# Patient Record
Sex: FEMALE | Race: WHITE | Employment: UNEMPLOYED | URBAN - METROPOLITAN AREA
[De-identification: names, ages, dates, MRNs, and addresses within clinical notes are randomized per-mention and may not be internally consistent; named-entity substitution may affect disease eponyms.]

---

## 2018-09-09 ENCOUNTER — HOSPITAL ENCOUNTER (EMERGENCY)
Age: 83
Discharge: HOME | End: 2018-09-09
Attending: EMERGENCY MEDICINE
Payer: MEDICARE

## 2018-09-09 VITALS
BODY MASS INDEX: 20.52 KG/M2 | HEART RATE: 96 BPM | OXYGEN SATURATION: 96 % | TEMPERATURE: 97.6 F | RESPIRATION RATE: 16 BRPM | SYSTOLIC BLOOD PRESSURE: 162 MMHG | WEIGHT: 120.2 LBS | DIASTOLIC BLOOD PRESSURE: 108 MMHG | HEIGHT: 64 IN

## 2018-09-09 DIAGNOSIS — S81.811A NONINFECTED SKIN TEAR OF RIGHT LOWER EXTREMITY, INITIAL ENCOUNTER: Primary | ICD-10-CM

## 2018-09-09 PROCEDURE — 99282 EMERGENCY DEPT VISIT SF MDM: CPT

## 2018-09-09 PROCEDURE — 6370000000 HC RX 637 (ALT 250 FOR IP): Performed by: EMERGENCY MEDICINE

## 2018-09-09 RX ORDER — CEPHALEXIN 500 MG/1
500 CAPSULE ORAL 4 TIMES DAILY
Qty: 28 CAPSULE | Refills: 0 | Status: SHIPPED | OUTPATIENT
Start: 2018-09-09 | End: 2018-09-16

## 2018-09-09 RX ORDER — CEPHALEXIN 500 MG/1
500 CAPSULE ORAL ONCE
Status: COMPLETED | OUTPATIENT
Start: 2018-09-09 | End: 2018-09-09

## 2018-09-09 RX ADMIN — CEPHALEXIN 500 MG: 500 CAPSULE ORAL at 21:54

## 2018-09-10 NOTE — ED NOTES
Pt dc/d with instructions and rx in stable condition, ambulatory to lobby. Home per ride.       Carol Jaquez RN  09/09/18 3301

## 2018-09-10 NOTE — ED PROVIDER NOTES
Emergency Department Encounter  [address]     Patient: Trena Rodriguez  MRN: [id number]  : 1929  Date of Evaluation: 2018  ED Provider: Seema Mccarty MD    Chief Complaint       Chief Complaint   Patient presents with    Other     skin tear to rt ant shin last night on a metal bed frame     Nae Soto is a 80 y.o. female who presents to the emergency department This is a very pleasant 20-year-old female is diabetic brought to the emergency department for evaluation of a skin tear to her right shin. Patient reports being in usual state of health until yesterday. She says that she struck her shin on the metal bed frame. Suffered a large skin tear to the anterior aspect of her right lower leg. Patient is not on any blood thinners at this in an 81 mg aspirin. She is not diabetic. Unsure whether or not her tetanus shot has been updated within the past 5 years. Patient denies numbness tingling or coldness in her right foot and has been ambulatory since the incident. ROS:     At least 10 systems reviewed and otherwise acutely negative except as in the [address] Past History     Past Medical History:   Diagnosis Date    Hyperlipidemia     Hypertension     Hypothyroidism      Past Surgical History:   Procedure Laterality Date    JOINT REPLACEMENT      rt knee replacement     Social History     Social History    Marital status: Unknown     Spouse name: N/A    Number of children: N/A    Years of education: N/A     Social History Main Topics    Smoking status: Former Smoker    Smokeless tobacco: Never Used    Alcohol use Yes      Comment: social    Drug use: Unknown    Sexual activity: Not Asked     Other Topics Concern    None     Social History Narrative    None       Medications/Allergies     Previous Medications    AMITRIPTYLINE (ELAVIL) 25 MG TABLET        ASPIRIN 81 MG TABLET    Take 81 mg by mouth daily.     CYANOCOBALAMIN (VITAMIN B-12 PO) Patient tolerated the procedure well. He was then covered with a nonstick dressing and covered with Kerlix. Patient was advised to keep the dressing place for 48 hours and to be seen by her primary care physician during this time. ED Course and MDM   In brief, Felicie Schlatter is a 80 y.o. female who presented to the emergency department For evaluation of skin tear over the right anterior shin. Based on patient's history and physical do not believe any imaging or laboratory work is needed at this time. Patient was provided Keflex here in the emergency department. Her tetanus shot is updated already within the past 1 year. Skin tear was approximated as per procedure noted above. ED Medication Orders     Start Ordered     Status Ordering Provider    09/09/18 2145 09/09/18 2135  cephALEXin (KEFLEX) capsule 500 mg  ONCE      Last MAR action:  Given - by Pato Ross on 09/09/18 at [address]          Final Impression      1.  Noninfected skin tear of right lower extremity, initial encounter      DISPOSITION       (Please note that portions of this note may have been completed with a voice recognition program. Efforts were made to edit the dictations but occasionally words are mis-transcribed.)    Humera Yeung MD  [address], [place]  09/09/18 0287

## 2019-02-12 ENCOUNTER — OFFICE VISIT (OUTPATIENT)
Dept: DERMATOLOGY | Age: 84
End: 2019-02-12
Payer: MEDICARE

## 2019-02-12 DIAGNOSIS — D48.5 NEOPLASM OF UNCERTAIN BEHAVIOR OF SKIN: Primary | ICD-10-CM

## 2019-02-12 PROCEDURE — 11103 TANGNTL BX SKIN EA SEP/ADDL: CPT | Performed by: DERMATOLOGY

## 2019-02-12 PROCEDURE — 11102 TANGNTL BX SKIN SINGLE LES: CPT | Performed by: DERMATOLOGY

## 2019-02-14 LAB — DERMATOLOGY PATHOLOGY REPORT: ABNORMAL

## 2019-02-18 DIAGNOSIS — C44.310 BASAL CELL CARCINOMA (BCC) OF SKIN OF FACE, UNSPECIFIED PART OF FACE: Primary | ICD-10-CM

## 2019-02-22 ENCOUNTER — TELEPHONE (OUTPATIENT)
Dept: SURGERY | Age: 84
End: 2019-02-22

## 2019-06-03 ENCOUNTER — TELEPHONE (OUTPATIENT)
Dept: DERMATOLOGY | Age: 84
End: 2019-06-03

## 2019-06-11 NOTE — TELEPHONE ENCOUNTER
SAVANNA:  Called and spoke to pt regarding Moh's surgery. Pt stated she would not like to proceed with getting multiple BCC's treated.

## 2019-07-31 ENCOUNTER — OFFICE VISIT (OUTPATIENT)
Dept: DERMATOLOGY | Age: 84
End: 2019-07-31
Payer: MEDICARE

## 2019-07-31 DIAGNOSIS — L72.0 MILIA: Primary | ICD-10-CM

## 2019-07-31 DIAGNOSIS — L57.0 ACTINIC KERATOSIS: ICD-10-CM

## 2019-07-31 DIAGNOSIS — C44.41 BCC (BASAL CELL CARCINOMA), SCALP/NECK: ICD-10-CM

## 2019-07-31 DIAGNOSIS — C44.310 BCC (BASAL CELL CARCINOMA), FACE: ICD-10-CM

## 2019-07-31 PROCEDURE — 17003 DESTRUCT PREMALG LES 2-14: CPT | Performed by: DERMATOLOGY

## 2019-07-31 PROCEDURE — 17000 DESTRUCT PREMALG LESION: CPT | Performed by: DERMATOLOGY

## 2019-07-31 PROCEDURE — 99213 OFFICE O/P EST LOW 20 MIN: CPT | Performed by: DERMATOLOGY

## 2019-07-31 NOTE — PROGRESS NOTES
[facility]  Areli Randall MD  [phone]      Christopher Dyson  5/16/1929    80 y.o. female     Date of Visit: 7/31/2019    Chief Complaint: BCCs    History of Present Illness:    1. She complains of few asymptomatic lesions on the lower face. 2.  She has multiple BCCs on the face - see below. Initially declined treatment but is reconsidering. DIAGNOSIS:   A. Right central frontal scalp-   Basal Cell Carcinoma, Nodular Type     B. Right central forehead-   Basal Cell Carcinoma, Nodular Type     C. Left upper forehead-   Basal Cell Carcinoma, Nodular Type     D. Right superior temple-   Basal Cell Carcinoma, Nodular Type     E. Right lower temple-   Basal Cell Carcinoma, Nodular Type     F. Right lower cheek-   Basal Cell Carcinoma, Nodular Type (not visible today)    Has hx of likely radiation therapy for acne. 3.  She also complains of persistent scaly lesions on the left upper lip and left cheek    Review of Systems:  Skin: No new or changing moles. Past Medical History, Family History, Surgical History, Medications and Allergies reviewed. Past Medical History:   Diagnosis Date    Basal cell carcinoma     Hyperlipidemia     Hypertension     Hypothyroidism      Past Surgical History:   Procedure Laterality Date    JOINT REPLACEMENT  2010    rt knee replacement    JOINT REPLACEMENT  2014    L knee       No Known Allergies  Outpatient Medications Marked as Taking for the 7/31/19 encounter (Office Visit) with Nanette Martin MD   Medication Sig Dispense Refill    vitamin D3 (CHOLECALCIFEROL) 400 units TABS tablet Take 400 Units by mouth daily      vitamin E 400 UNIT capsule Take 400 Units by mouth daily      Multiple Vitamin (MULTI VITAMIN DAILY PO) Take  by mouth daily.  aspirin 81 MG tablet Take 81 mg by mouth daily.       amitriptyline (ELAVIL) 25 MG tablet       traZODone (DESYREL) 50 MG tablet       traMADol (ULTRAM) 50 MG tablet      

## 2019-08-01 ENCOUNTER — TELEPHONE (OUTPATIENT)
Dept: SURGERY | Age: 84
End: 2019-08-01

## 2019-08-01 NOTE — TELEPHONE ENCOUNTER
Pt called back and scheduled 2 Mohs; after having a consultation with Dr. Ailin Leigh earlier in the year. She scheduled the L nasal dorsum - NBCC focally infiltrating on 8/13 at 7:30 & the Right nasal tip - NBCC.       There are still 6 sites pending to schedule but the patient wanted the nose done first.      6 sites pending scheduling are:   1.) NBCC R central frontal scalp  2.) R central forehead - NBCC  3.) L upper forheaed - NBCC  4.) R superior temple - NBCC   5.) R lower temple - NBCC   6.) R lower cheek  - NBCC

## 2019-08-13 ENCOUNTER — PROCEDURE VISIT (OUTPATIENT)
Dept: SURGERY | Age: 84
End: 2019-08-13
Payer: MEDICARE

## 2019-08-13 VITALS
DIASTOLIC BLOOD PRESSURE: 82 MMHG | HEART RATE: 80 BPM | OXYGEN SATURATION: 98 % | WEIGHT: 125 LBS | SYSTOLIC BLOOD PRESSURE: 155 MMHG | BODY MASS INDEX: 21.46 KG/M2

## 2019-08-13 DIAGNOSIS — C44.311 BASAL CELL CARCINOMA (BCC) OF DORSUM OF NOSE: Primary | ICD-10-CM

## 2019-08-13 PROCEDURE — 17311 MOHS 1 STAGE H/N/HF/G: CPT | Performed by: DERMATOLOGY

## 2019-08-13 PROCEDURE — 17312 MOHS ADDL STAGE: CPT | Performed by: DERMATOLOGY

## 2019-08-13 PROCEDURE — 14060 TIS TRNFR E/N/E/L 10 SQ CM/<: CPT | Performed by: DERMATOLOGY

## 2019-08-13 NOTE — PROGRESS NOTES
closed using  5-0  Vicryl and the epidermis was approximated using 6-0 Ethilon running epidermal sutures . WOUND COVERAGE:  The wound was cleaned with normal saline solution, dried off, Aquaphor ointment was applied, and the wound was covered. A dressing was applied for stabilization and light pressure. The patient was given detailed oral and written instructions on postoperative care. There were no complications. The patient left the Unit in good medical condition. FOLLOW-UP:  The patient will return for suture removal in 7 days.

## 2019-08-14 ENCOUNTER — TELEPHONE (OUTPATIENT)
Dept: SURGERY | Age: 84
End: 2019-08-14

## 2019-08-14 ENCOUNTER — TELEPHONE (OUTPATIENT)
Dept: DERMATOLOGY | Age: 84
End: 2019-08-14

## 2019-08-20 ENCOUNTER — PROCEDURE VISIT (OUTPATIENT)
Dept: SURGERY | Age: 84
End: 2019-08-20
Payer: MEDICARE

## 2019-08-20 VITALS
OXYGEN SATURATION: 92 % | SYSTOLIC BLOOD PRESSURE: 136 MMHG | DIASTOLIC BLOOD PRESSURE: 74 MMHG | BODY MASS INDEX: 20.6 KG/M2 | TEMPERATURE: 97.6 F | WEIGHT: 120 LBS | HEART RATE: 101 BPM

## 2019-08-20 DIAGNOSIS — Z48.02 VISIT FOR SUTURE REMOVAL: ICD-10-CM

## 2019-08-20 DIAGNOSIS — C44.319 BASAL CELL CARCINOMA OF RIGHT TEMPLE REGION: Primary | ICD-10-CM

## 2019-08-20 PROCEDURE — 12052 INTMD RPR FACE/MM 2.6-5.0 CM: CPT | Performed by: DERMATOLOGY

## 2019-08-20 PROCEDURE — 17311 MOHS 1 STAGE H/N/HF/G: CPT | Performed by: DERMATOLOGY

## 2019-08-20 NOTE — PROGRESS NOTES
PRE-PROCEDURE SCREENING    Pacemaker/ICD: No  Difficulty with numbing in the past: No  Local Anesthesia Reaction/passing out: No  Latex or adhesive allergy:  No  Bleeding/Clotting Disorders: No  Anticoagulant Therapy: Yes, asa 81 mg  Joint prosthesis: Yes, bilateral knees (most recent approx 2014)  Artificial Heart Valve: No  Stroke or Seizures: No  Organ Transplant or Lymphoma: No  Immunosuppression: No  Respiratory Problems: No

## 2019-08-20 NOTE — PROGRESS NOTES
S:  The patient is here for suture removal s/p Mohs surgery on the left nasal sidewall and Unilateral Advancement Flap repair, 1 week(s) ago. The site appears well-healed without signs of infection (redness, pain or discharge). The sutures were removed. Flap looks good. Wound care and activity instructions given. The patient was scheduled for follow-up in 7-8 weeks for scar/wound check. The patient was scheduled for f/u with General Dermatology per their instructions.

## 2019-08-20 NOTE — PATIENT INSTRUCTIONS
improve. However, you may have swelling for several months after your procedure, especially if your wound is on your face, near your eye or mouth. 2.  Some soreness and redness around your wound. 3.  Bruising, which could last 1 week or more. 4.  Pink and bumpy appearance to the scar. This may happen a few weeks after your procedure. After 4 weeks, you may gently massage the area each day with a facial moisturizer or petroleum jelly (Vaseline) or Aquaphor. This will help to smooth the skin and improve the appearance of the scar. The color of your scar will fade over time, but may be pink for several months after the procedure. The scar may take 6 months to 1 year to reach its final color and appearance. 5.  Spitting suture. Occasionally, an inside suture (stitch) does not completely dissolve. When this happens, (generally 4-8 weeks after surgery), it causes a bump or pimple to form on the scar. This is easily removed and is not at all serious. It does not mean the skin cancer has returned. Contact us if it happens, but do not be alarmed. Vitamin E oil is NOT necessary. A good moisturizer is just as effective. Sunscreen IS necessary. Use at least an SPF 30 sunscreen daily- even in the winter.      Call us at [phone] right away if you have any of the following symptoms:   Bleeding that you cant stop (see above)   Pain that lasts longer than 48 hours   Your wound becomes more painful, red or hot   Bruising and swelling that does not improve within 48 hours or gets worse suddenly

## 2019-08-21 ENCOUNTER — TELEPHONE (OUTPATIENT)
Dept: SURGERY | Age: 84
End: 2019-08-21

## 2019-08-21 NOTE — TELEPHONE ENCOUNTER
The patient was in the office 8/20/19 for mohs located on the right superior temple with [address] repair. The patient tolerated the procedure well and left the office in good condition. A post-operative telephone call was placed at 12:46pm on 8/21/19 in order to check on the patient's recovery process. The patient reported doing well and had no complaints. All of the patient's questions were answered.

## 2019-09-16 ENCOUNTER — TELEPHONE (OUTPATIENT)
Dept: SURGERY | Age: 84
End: 2019-09-16

## 2019-09-16 NOTE — TELEPHONE ENCOUNTER
Discussed with patient the possibility of a spitting suture, patient wants to attempt massaging daily for 2 days to see if the area improves, if not she will call to get appt. for wound check/removal of spitting suture. Pt verbalized understanding.

## 2019-10-02 ENCOUNTER — OFFICE VISIT (OUTPATIENT)
Dept: SURGERY | Age: 84
End: 2019-10-02

## 2019-10-02 DIAGNOSIS — T81.49XA WOUND INFECTION AFTER SURGERY: ICD-10-CM

## 2019-10-02 DIAGNOSIS — Z51.89 VISIT FOR WOUND CHECK: Primary | ICD-10-CM

## 2019-10-02 PROCEDURE — 99024 POSTOP FOLLOW-UP VISIT: CPT | Performed by: DERMATOLOGY

## 2019-10-02 RX ORDER — CEPHALEXIN 500 MG/1
500 CAPSULE ORAL 3 TIMES DAILY
Qty: 15 CAPSULE | Refills: 0 | Status: SHIPPED | OUTPATIENT
Start: 2019-10-02 | End: 2020-02-18

## 2019-10-08 ENCOUNTER — OFFICE VISIT (OUTPATIENT)
Dept: SURGERY | Age: 84
End: 2019-10-08

## 2019-10-08 DIAGNOSIS — Z51.89 VISIT FOR WOUND CHECK: Primary | ICD-10-CM

## 2019-10-08 PROCEDURE — 99024 POSTOP FOLLOW-UP VISIT: CPT | Performed by: DERMATOLOGY

## 2019-10-15 ENCOUNTER — OFFICE VISIT (OUTPATIENT)
Dept: DERMATOLOGY | Age: 84
End: 2019-10-15
Payer: MEDICARE

## 2019-10-15 DIAGNOSIS — C44.310 BASAL CELL CARCINOMA (BCC) OF FACE: Primary | ICD-10-CM

## 2019-10-15 PROCEDURE — 99212 OFFICE O/P EST SF 10 MIN: CPT | Performed by: DERMATOLOGY

## 2019-10-21 ENCOUNTER — OFFICE VISIT (OUTPATIENT)
Dept: SURGERY | Age: 84
End: 2019-10-21

## 2019-10-21 DIAGNOSIS — Z51.89 VISIT FOR WOUND CHECK: Primary | ICD-10-CM

## 2019-10-21 DIAGNOSIS — M60.20 FOREIGN BODY REACTION: ICD-10-CM

## 2019-10-21 PROCEDURE — 99024 POSTOP FOLLOW-UP VISIT: CPT | Performed by: DERMATOLOGY

## 2019-11-04 ENCOUNTER — OFFICE VISIT (OUTPATIENT)
Dept: SURGERY | Age: 84
End: 2019-11-04

## 2019-11-04 DIAGNOSIS — L90.5 SCAR: Primary | ICD-10-CM

## 2019-11-04 PROCEDURE — 99024 POSTOP FOLLOW-UP VISIT: CPT | Performed by: DERMATOLOGY

## 2019-12-02 ENCOUNTER — OFFICE VISIT (OUTPATIENT)
Dept: DERMATOLOGY | Age: 84
End: 2019-12-02
Payer: MEDICARE

## 2019-12-02 DIAGNOSIS — C44.310 BCC (BASAL CELL CARCINOMA), FACE: Primary | ICD-10-CM

## 2019-12-02 PROCEDURE — 99213 OFFICE O/P EST LOW 20 MIN: CPT | Performed by: DERMATOLOGY

## 2019-12-02 RX ORDER — IMIQUIMOD 12.5 MG/.25G
CREAM TOPICAL
Qty: 1 BOX | Refills: 2 | Status: SHIPPED | OUTPATIENT
Start: 2019-12-02 | End: 2019-12-09

## 2019-12-10 ENCOUNTER — PROCEDURE VISIT (OUTPATIENT)
Dept: SURGERY | Age: 84
End: 2019-12-10
Payer: MEDICARE

## 2019-12-10 VITALS
WEIGHT: 123.8 LBS | SYSTOLIC BLOOD PRESSURE: 179 MMHG | HEART RATE: 80 BPM | BODY MASS INDEX: 21.25 KG/M2 | OXYGEN SATURATION: 95 % | DIASTOLIC BLOOD PRESSURE: 101 MMHG | TEMPERATURE: 97.5 F

## 2019-12-10 DIAGNOSIS — C44.311 BASAL CELL CARCINOMA OF RIGHT NASAL TIP: Primary | ICD-10-CM

## 2019-12-10 PROCEDURE — 14060 TIS TRNFR E/N/E/L 10 SQ CM/<: CPT | Performed by: DERMATOLOGY

## 2019-12-10 PROCEDURE — 17312 MOHS ADDL STAGE: CPT | Performed by: DERMATOLOGY

## 2019-12-10 PROCEDURE — 17311 MOHS 1 STAGE H/N/HF/G: CPT | Performed by: DERMATOLOGY

## 2019-12-10 RX ORDER — CEPHALEXIN 500 MG/1
500 CAPSULE ORAL 3 TIMES DAILY
Qty: 15 CAPSULE | Refills: 0 | Status: SHIPPED | OUTPATIENT
Start: 2019-12-10 | End: 2020-02-18

## 2019-12-11 ENCOUNTER — TELEPHONE (OUTPATIENT)
Dept: SURGERY | Age: 84
End: 2019-12-11

## 2019-12-11 ENCOUNTER — NURSE ONLY (OUTPATIENT)
Dept: SURGERY | Age: 84
End: 2019-12-11

## 2019-12-11 DIAGNOSIS — Z51.89 VISIT FOR WOUND CHECK: Primary | ICD-10-CM

## 2019-12-11 PROCEDURE — 99024 POSTOP FOLLOW-UP VISIT: CPT | Performed by: DERMATOLOGY

## 2019-12-12 ENCOUNTER — TELEPHONE (OUTPATIENT)
Dept: SURGERY | Age: 84
End: 2019-12-12

## 2019-12-17 ENCOUNTER — OFFICE VISIT (OUTPATIENT)
Dept: SURGERY | Age: 84
End: 2019-12-17

## 2019-12-17 DIAGNOSIS — Z48.02 VISIT FOR SUTURE REMOVAL: Primary | ICD-10-CM

## 2019-12-17 PROCEDURE — 99024 POSTOP FOLLOW-UP VISIT: CPT | Performed by: DERMATOLOGY

## 2019-12-17 RX ORDER — IMIQUIMOD 12.5 MG/.25G
CREAM TOPICAL
COMMUNITY
End: 2022-07-06

## 2019-12-26 ENCOUNTER — TELEPHONE (OUTPATIENT)
Dept: DERMATOLOGY | Age: 84
End: 2019-12-26

## 2020-01-27 ENCOUNTER — PROCEDURE VISIT (OUTPATIENT)
Dept: SURGERY | Age: 85
End: 2020-01-27
Payer: MEDICARE

## 2020-01-27 VITALS
SYSTOLIC BLOOD PRESSURE: 154 MMHG | TEMPERATURE: 98.3 F | WEIGHT: 125.2 LBS | BODY MASS INDEX: 21.49 KG/M2 | DIASTOLIC BLOOD PRESSURE: 83 MMHG | OXYGEN SATURATION: 97 % | HEART RATE: 73 BPM

## 2020-01-27 PROBLEM — C44.41 BASAL CELL CARCINOMA OF SCALP: Status: ACTIVE | Noted: 2020-01-27

## 2020-01-27 PROCEDURE — 17312 MOHS ADDL STAGE: CPT | Performed by: DERMATOLOGY

## 2020-01-27 PROCEDURE — 17311 MOHS 1 STAGE H/N/HF/G: CPT | Performed by: DERMATOLOGY

## 2020-01-27 PROCEDURE — 12032 INTMD RPR S/A/T/EXT 2.6-7.5: CPT | Performed by: DERMATOLOGY

## 2020-01-27 RX ORDER — ASCORBIC ACID 500 MG
1000 TABLET ORAL DAILY
COMMUNITY

## 2020-01-27 NOTE — PROGRESS NOTES
PRE-PROCEDURE SCREENING    Pacemaker/ICD: No  Difficulty with numbing in the past: No  Local Anesthesia Reaction/passing out: No  Latex or adhesive allergy:  No  Bleeding/Clotting Disorders: No  Anticoagulant Therapy: Yes -  Aspirin 81 mg daily  Joint prosthesis: Yes  - bilateral knees '2014  Artificial Heart Valve: No  Stroke or Seizures: No  Organ Transplant or Lymphoma: No  Immunosuppression: No  Respiratory Problems: No

## 2020-01-27 NOTE — PATIENT INSTRUCTIONS
[facility] Office Hours:    Monday-Wednesday  7:30 AM-4:30 PM    Thursday  8:00 AM-4:30 PM    Friday  9:00 AM-3:00 PM          POST-OPERATIVE CARE FOR STICHES              Bandage change after 48 hours    CARING FOR YOUR SURGICAL SITE  The bandage should remain on and completley dry for 48 hours. Do NOT get the bandage wet. 1. After the first 48 hours, gently remove the remaining part of the bandage. It can be helpful to moisten the bandage edges in the shower. Steri strips may still be on the wound. It is ok, they will fall off slowly with the daily bandage changes. 2. Gently clean the wound daily with mild soap and water. Try to clean off crust and debris. 3. Dry (pat) the area with a clean Q-tip or gauze. 4. Apply a layer of Vaseline/ Aquaphor (or Bacitracin if your doctor recommends) to the wound area only. 5. Cut a piece of Telfa (or any non-stick dressing) to fit just over the wound and secure it with paper tape. If the wound is small you may use a Band- Aid. Keep area covered for a total of 3 week(s). If the dressing comes off or if you have questions, or concerns about the dressing, please call the office for instructions! POST OPERATIVE INSTRUCTIONS    1. Activity: Do not lift anything heavier than a gallon of milk for 1 week. Also, avoid strenuous activity such as running, power walking or contact sports. 2. Eating and drinking: Do not drink alcohol for 48 hours after your procedure. Alcohol increases the chances of bleeding. 3. Medicines   -If you have discomfort, take Acetaminophen (Tylenol or Extra Strength Tylenol). Follow the instructions and warning on the bottle. -If your doctor has prescribed you an Aspirin daily, please keep taking it. Do not take extra Aspirin or medicines containing Aspirin (such as [place] and Excedrin) for 48 hours  after your procedure. Bleeding: If bleeding occurs, DO NOT remove the bandage.  Put firm pressure on the area with gauze for 20 minutes without peeking. If the bleeding continue, apply pressure for another 20 minutes. If the bleeding does not stop after you apply pressure, call us right away. If you can not call, go to the nearest emergency room or urgent care facility. What to expect:  You may have these symptoms. They are normal and should get better with time:  1. Swelling. Swelling usually increases for the first 48 hours after your procedure and then begins to improve. Some soreness and redness around your wound. If we worked close to you eyes  (forehead, nose, temple, or upper cheeks) your eyes may become swollen and/ or black and blue. 2. Bruising, which could last 1 week or more. 3. Pink and bumpy appearance to the scar. This may happen a few weeks after your procedure. After 4 weeks, you may gently massage the area each day with facial moisturizer or petroleum jelly (Vaseline or Aquaphor). This will help to smooth the skin and improve the appearance of the scar. The color of your scar will fade over time, but may be pink for several months after the procedure. The scar may take 6 months to 1 year to reach its final color and appearance. 4. \"Spitting\" suture. Occasionally, an inside suture (stitch) does not completely dissolve. When this happens, (generally 4-8 weeks after surgery), it causes a bump or \"pimple\" to form on the scar. This is easily removed and is not at all serious. It does not mean the skin cancer has returned. Contact us if it happens, but do not be alarmed. Vitamin E oil is NOT necessary. A good moisturizer is just as effective. Sunscreen IS necessary.  Use at least and SPF 30 sunscreen daily- even in winter    Call us at [phone] right away if you have any of the following symptoms:  -Bleeding that you can not stop (see highlighted area above)   -Pain that lasts longer than 48 hours  -Your wound becomes  more painful, red or hot  -Bruising and swelling that does not begin to improve

## 2020-01-27 NOTE — PROGRESS NOTES
MOHS PROCEDURE NOTE    PHYSICIAN:  Mitcheal Fleischer. Makayla Calhoun MD    ASSISTANT: Carol Love, RN, Latoya Townsend RN     REFERRING PROVIDER:   Javier Arriaza MD    PREOPERATIVE DIAGNOSIS: Nodular Basal Cell Carcinoma     SPECIFIC MOHS INDICATIONS:  location    AUC SCORIN    POSTOPERATIVE DIAGNOSIS: SAME    LOCATION: Right central frontal scalp    OPERATIVE PROCEDURE:  MOHS MICROGRAPHIC SURGERY    RECONSTRUCTION OF DEFECT: Intermediate layered closure    PREOPERATIVE SIZE: 17x11 MM    DEFECT SIZE: 25x16 MM    LENGTH OF REPAIRED WOUND/SIZE OF FLAP/SIZE OF GRAFT:  45 MM    ANESTHESIA:  11mL 1% lidocaine with epinephrine 1:100,000 buffered. EBL:  MINIMAL    DURATION OF PROCEDURE:  1 HOUR 15 MINUTES    POSTOPERATIVE OBSERVATION: 1 HOUR    SPECIMENS:  SEE MOHS MAP    COMPLICATIONS:  NONE    DESCRIPTION OF PROCEDURE:  The patient was given a mirror, as appropriate, and the biopsy site was identified, marked with a surgical marking pen, and verified by the patient. Options for treatment were discussed and the patient was informed that Mohs surgery was the selected treatment based on its lower recurrence rate, given the features listed above, as compared to other treatment modalities such as excision, radiation, or curettage, and agreed with this treatment plan. Risks and benefits including bruising, swelling, bleeding, infection, nerve injury, recurrence, and scarring were discussed with the patient prior to the procedure and a written consent detailing these and other risks was reviewed with the patient and signed. There was a time out for person and procedure verification. The surgical site was prepped with an antiseptic solution. Application of an antiseptic solution was repeated before each surgical stage. Stage I:  The clinically-apparent tumor was carefully defined and debulked, determining the edge of the surgical excision.     A thin layer of tumor-laden tissue was excised with a narrow margin of obtained with spot monopolar electrocoagulation. Subcutaneous dead space and dermis were closed using 4-0 Monocryl buried subcutaneous interrupted suture and the epidermis was approximated with 5-0 Prolene simple interrupted stitches. WOUND COVERAGE:  The wound was cleaned with normal saline solution, dried off, Aquaphor ointment was applied, and the wound was covered. A dressing was applied for stabilization and light pressure. The patient was given detailed oral and written instructions on postoperative care. There were no complications. The patient left the Unit in good medical condition. FOLLOW-UP:  The patient will return for suture removal in 14 days.

## 2020-01-28 ENCOUNTER — TELEPHONE (OUTPATIENT)
Dept: SURGERY | Age: 85
End: 2020-01-28

## 2020-01-28 NOTE — TELEPHONE ENCOUNTER
The patient was in the office on 1/27/2020 for Mohs located on the Right central frontal scalp with ILC repair. The patient tolerated the procedure well and left the office in good condition. Pain level on post-operative day 1:  headaches only per PT, but is staying in and not doing a great deal.  Headache is manageable w/Tylenol. Pain level - 6 - 7 yesterday, a lot better today. PT advises she has been sleeping a great deal due to headache. Any bleeding episode that required pressure to be held, bandage change or a call to the office or MD?  no     Any other issues?:  no    A post-operative telephone call was placed at 10:52a, 1/28/2020 in order to check on the patient's recovery process. The patient reported doing well and had no complaints other than those listed above, if any. All of the patient's questions were answered. Advised to call w/any questions/concerns.

## 2020-02-10 ENCOUNTER — OFFICE VISIT (OUTPATIENT)
Dept: SURGERY | Age: 85
End: 2020-02-10

## 2020-02-10 PROCEDURE — 99024 POSTOP FOLLOW-UP VISIT: CPT | Performed by: DERMATOLOGY

## 2020-02-10 RX ORDER — CEPHALEXIN 500 MG/1
500 CAPSULE ORAL 3 TIMES DAILY
Qty: 15 CAPSULE | Refills: 0 | Status: SHIPPED | OUTPATIENT
Start: 2020-02-10 | End: 2020-02-18

## 2020-02-11 NOTE — PROGRESS NOTES
S: The patient is here today for wound/scar check. The patient had Mohs surgery located on the right frontal scalp with Intermediate layered closure repair, 3 week(s) ago. The patient c/o tender area at front of suture line. EXAM: sutures removed and looks good. Area at frontal aspect of incision that is slightly fluctuant. Area anesthetized with 1% lido with epi and incised with 11 blade and no purulent drainage. IMPRESSION/PLAN:  Concern for impending small abscess. Keflex 500mg tid. For  5 days.

## 2020-02-18 ENCOUNTER — OFFICE VISIT (OUTPATIENT)
Dept: DERMATOLOGY | Age: 85
End: 2020-02-18
Payer: MEDICARE

## 2020-02-18 PROCEDURE — 99213 OFFICE O/P EST LOW 20 MIN: CPT | Performed by: DERMATOLOGY

## 2020-02-18 NOTE — PROGRESS NOTES
[facility]  Elvira Lundberg MD  [phone]      Demetrio Salazar  5/16/1929    80 y.o. female     Date of Visit: 2/18/2020    Chief Complaint: BCCs    History of Present Illness:    1. Blas Rich returns for reevaluation of multiple BCCs on the forehead that were treated with Aldara for 6 weeks. She reports tolerable irritation and shrinking of lesions that were treated. DIAGNOSIS:   A. Right central frontal scalp-   Basal Cell Carcinoma, Nodular Type -treated with Mohs by Dr. Alexis Sinha on 1/27/2020. B. Right central forehead-   Basal Cell Carcinoma, Nodular Type     C. Left upper forehead-   Basal Cell Carcinoma, Nodular Type     D. Right superior temple-   Basal Cell Carcinoma, Nodular Type -treated with Mohs by Dr. Alexis Sinha on 8/20/2019. E. Right lower temple-   Basal Cell Carcinoma, Nodular Type     F. Right lower cheek-   Basal Cell Carcinoma, Nodular Type - cleared with bx.      G. Right nasal tip-   Basal Cell Carcinoma, Nodular Type -treated with Mohs on 12/10/2019. H. Left nasal dorsum-   Basal cell carcinoma, focally infiltrating - treated with Mohs by Dr. Alexis Sinha on 8/13/2019. Review of Systems:  Skin: No lesions or rashes. Past Medical History, Family History, Surgical History, Medications and Allergies reviewed.     Past Medical History:   Diagnosis Date    Basal cell carcinoma     Hyperlipidemia     Hypertension     Hypothyroidism      Past Surgical History:   Procedure Laterality Date    JOINT REPLACEMENT  2010    rt knee replacement    JOINT REPLACEMENT  2014    L knee    MOHS SURGERY  08/20/2019    R temple    MOHS SURGERY Right 12/10/2019    NBCC right nasal tip        No Known Allergies  Outpatient Medications Marked as Taking for the 2/18/20 encounter (Office Visit) with Desmond North MD   Medication Sig Dispense Refill    vitamin C (ASCORBIC ACID) 500 MG tablet Take 1,000 mg by mouth daily      imiquimod (ALDARA) 5 % cream Apply topically three

## 2020-03-26 ENCOUNTER — TELEPHONE (OUTPATIENT)
Dept: DERMATOLOGY | Age: 85
End: 2020-03-26

## 2020-04-01 ENCOUNTER — OFFICE VISIT (OUTPATIENT)
Dept: SURGERY | Age: 85
End: 2020-04-01
Payer: MEDICARE

## 2020-04-01 ENCOUNTER — TELEPHONE (OUTPATIENT)
Dept: SURGERY | Age: 85
End: 2020-04-01

## 2020-04-01 PROCEDURE — 99212 OFFICE O/P EST SF 10 MIN: CPT | Performed by: DERMATOLOGY

## 2020-04-01 PROCEDURE — 10060 I&D ABSCESS SIMPLE/SINGLE: CPT | Performed by: DERMATOLOGY

## 2020-04-01 NOTE — TELEPHONE ENCOUNTER
Called pharmacy with prescription of Keflex 500mg TID for 7 days. Patient is aware prescription was sent to her pharmacy.

## 2020-04-01 NOTE — PROGRESS NOTES
S: pt complains of one-week history of a painful swelling in the right upper nasal dorsum. No previous history of anything similar. The patient does have a history of multiple nonmelanoma skin cancers. She has not had any surgery on the nose for over 6 weeks. She has tried warm compresses without relief. O: Well-developed well-nourished, no acute distress  On the right superior nasal sidewall close to the medial canthus is a 1 cm fluctuant erythematous painful nodule. AP:  1. Abscess: Uncertain etiology. Area cleansed with alcohol and an 11 blade used to make a small opening. The area was drained of moderate amount of purulent fluid. A culture swab was sent. The area was anesthetized with 1% lidocaine with epinephrine and further drainage performed. Bacitracin twice daily  Keflex 500 mg 3 times daily for 7 days  Await culture results and change antibiotics as necessary  We will check on patient remotely tomorrow, if the area fills again with purulent material we will plan on seeing her Monday for additional incision and drainage.

## 2020-04-02 ENCOUNTER — TELEPHONE (OUTPATIENT)
Dept: SURGERY | Age: 85
End: 2020-04-02

## 2020-04-03 LAB
GRAM STAIN RESULT: ABNORMAL
ORGANISM: ABNORMAL
WOUND/ABSCESS: ABNORMAL

## 2020-04-06 ENCOUNTER — TELEPHONE (OUTPATIENT)
Dept: SURGERY | Age: 85
End: 2020-04-06

## 2020-04-13 ENCOUNTER — TELEPHONE (OUTPATIENT)
Dept: SURGERY | Age: 85
End: 2020-04-13

## 2020-04-13 RX ORDER — DOXYCYCLINE HYCLATE 100 MG
100 TABLET ORAL 2 TIMES DAILY
Qty: 14 TABLET | Refills: 0 | Status: SHIPPED | OUTPATIENT
Start: 2020-04-13 | End: 2020-04-20

## 2020-04-13 NOTE — TELEPHONE ENCOUNTER
Patient noticed the area still \" doesn't look white\" and there's a white area that's puffed up-Not painful and hasn't noticed any drainage-she finished her keflex and isn't using bacitracin-she wasn't sure what she should do- She did try pushing around on the area and hasn't seen anything come out from it. She will be prescribed a different oral and topical antibiotic. If area doesn't improve with new medication, she will call back and possibly be seen. All questions answered.

## 2020-04-27 ENCOUNTER — TELEPHONE (OUTPATIENT)
Dept: SURGERY | Age: 85
End: 2020-04-27

## 2020-04-27 NOTE — TELEPHONE ENCOUNTER
The patient left a voice message on 4/24 stating that the site is still there and has not drained. Please call to address her concerns.

## 2020-04-27 NOTE — TELEPHONE ENCOUNTER
Patient had an abcessed that was drained on 4/1, however the lesion has not completely \"gone away\". She says there is no \"white head\" and has had no drainage, just tender when pressing on the area and it's red still. She does a warm compress daily and she is using topical mupirocin. Per , the area will be managed by continuing to use ointment and warm compresses (if she'd like). The area is not growing. Due to covid, no appointments are available, however when schedule opens up we will schedule patient for a check and possibly biopsy and if her symptoms change before we can schedule, she will call back. All questions answered.

## 2020-06-15 ENCOUNTER — TELEPHONE (OUTPATIENT)
Dept: SURGERY | Age: 85
End: 2020-06-15

## 2020-07-06 ENCOUNTER — OFFICE VISIT (OUTPATIENT)
Dept: SURGERY | Age: 85
End: 2020-07-06
Payer: MEDICARE

## 2020-07-06 VITALS — TEMPERATURE: 96.9 F

## 2020-07-06 PROBLEM — D48.9 NEOPLASM OF UNCERTAIN BEHAVIOR: Status: ACTIVE | Noted: 2020-07-06

## 2020-07-06 PROCEDURE — 11102 TANGNTL BX SKIN SINGLE LES: CPT | Performed by: DERMATOLOGY

## 2020-07-07 NOTE — PROGRESS NOTES
S: pt is here for f/u of a lesion on the right superior nasal sidewall. She was last here approx one month ago and purulent drainage was expressed from this area. Pt reports improvement and the area is now only minimally tender with no drainage, but the patient still feels there is material within that she can not express out. Pmhx: multiple nmsc    O: wdwn, nad  On the right superior nasal sidewall is a 7mm firm skin colored papule with an enlarged pore opening. AP:  1. Neoplasm of uncertain behavior:  R/O ruptured cyst vs scar Location: right superior nasal sidewall  - Discussed possible diagnosis. Patient agreeable to biopsy. Verbal and written consent obtained after risks (infection, bleeding, scar), benefits and alternatives explained. - The area to be biopsied was marked with a surgical marking pen and a verbal time-out was performed. - Local anesthesia was achieved with 1% lidocaine with epinephrine/sodium bicarbonate. - The area was cleansed with alcohol and a tangential shave biopsy was performed using a derma-blade. Hemostasis was achieved with aluminum chloride. A small amount of petroleum jelly was applied to the wound and a band-aid applied. - Specimen placed in a correctly identified specimen container. - 1 specimen(s) sent to pathology. There were no immediate complications and the patient left the office in good condition.  -  Patient educated re: risk of bleeding, infection, scar and wound care instructions reviewed. The patient will be informed of biopsy results by phone or letter as soon as available.

## 2020-07-08 ENCOUNTER — TELEPHONE (OUTPATIENT)
Dept: SURGERY | Age: 85
End: 2020-07-08

## 2020-07-08 LAB — DERMATOLOGY PATHOLOGY REPORT: NORMAL

## 2020-07-08 NOTE — TELEPHONE ENCOUNTER
I spoke with the patient today by telephone. I reviewed results of the biopsy with the patient. Date of biopsy: 7/6/2020  Site of biopsy: Right superior nasal sidewall  Result: Cyst    Plan: Benign, no further treatment    The patient expressed understanding of the plan.

## 2020-10-19 ENCOUNTER — OFFICE VISIT (OUTPATIENT)
Dept: DERMATOLOGY | Age: 85
End: 2020-10-19
Payer: MEDICARE

## 2020-10-19 VITALS — TEMPERATURE: 97.9 F

## 2020-10-19 PROCEDURE — 11103 TANGNTL BX SKIN EA SEP/ADDL: CPT | Performed by: DERMATOLOGY

## 2020-10-19 PROCEDURE — 11102 TANGNTL BX SKIN SINGLE LES: CPT | Performed by: DERMATOLOGY

## 2020-10-19 PROCEDURE — 99212 OFFICE O/P EST SF 10 MIN: CPT | Performed by: DERMATOLOGY

## 2020-10-19 NOTE — PROGRESS NOTES
[facility]  Sandrine Irwin MD  [phone]      Nataly Geller  5/16/1929    80 y.o. female     Date of Visit: 10/19/2020    Chief Complaint: BCCs    History of Present Illness:    1. She presents today to follow up for BCCs on the right central forehead, left upper forehead and right temple. She used Aldara nightly for about 12 weeks total without clearance of the tumors. DIAGNOSIS:   A. Right central frontal scalp-   Basal Cell Carcinoma, Nodular Type -treated with Mohs by Dr. Jeff Jackson on 1/27/2020. B. Right central forehead-   Basal Cell Carcinoma, Nodular Type     C. Left upper forehead-   Basal Cell Carcinoma, Nodular Type     D. Right superior temple-   Basal Cell Carcinoma, Nodular Type -treated with Mohs by Dr. Jeff Jackson on 8/20/2019. E. Right lower temple-   Basal Cell Carcinoma, Nodular Type     F. Right lower cheek-   Basal Cell Carcinoma, Nodular Type - cleared with bx.      G. Right nasal tip-   Basal Cell Carcinoma, Nodular Type -treated with Mohs on 12/10/2019. H. Left nasal dorsum-   Basal cell carcinoma, focally infiltrating - treated with Mohs by Dr. Jeff Jackson on 8/13/2019.    2.  Unknown duration of asymptomatic lesions on the right nasal ala and left medial lower cheek. Review of Systems:  Skin: No new or changing moles. Past Medical History, Family History, Surgical History, Medications and Allergies reviewed.     Past Medical History:   Diagnosis Date    Basal cell carcinoma     Hyperlipidemia     Hypertension     Hypothyroidism      Past Surgical History:   Procedure Laterality Date    JOINT REPLACEMENT  2010    rt knee replacement    JOINT REPLACEMENT  2014    L knee    MOHS SURGERY  08/20/2019    R temple    MOHS SURGERY Right 12/10/2019    [facility] right nasal tip        No Known Allergies  Outpatient Medications Marked as Taking for the 10/19/20 encounter (Office Visit) with Sheba Chaudhari MD   Medication Sig Dispense Refill    vitamin C (ASCORBIC ACID) 500 MG tablet Take 1,000 mg by mouth daily      imiquimod (ALDARA) 5 % cream Apply topically three times a week Apply topically three times a week.  Multiple Vitamin (MULTI VITAMIN DAILY PO) Take  by mouth daily.  aspirin 81 MG tablet Take 81 mg by mouth daily.  amitriptyline (ELAVIL) 25 MG tablet       traZODone (DESYREL) 50 MG tablet       traMADol (ULTRAM) 50 MG tablet       lisinopril-hydrochlorothiazide (PRINZIDE;ZESTORETIC) 20-12.5 MG per tablet       levothyroxine (SYNTHROID) 150 MCG tablet            Physical Examination       Well appearing. 1.  Right central forehead, left upper forehead and right lower temple with 3 telangiectatic erythematous papules. 2.    A. Right nasal ala -ill-defined telangiectatic pink patch with focal crusting. B. Left medial lower cheek with an oval-shaped telangiectatic pink patch. Assessment and Plan     1. BCCs (basal cell carcinoma), right central forehead, left upper forehead and right temple - did not clear with Aldara    Refer to Dr. Annette Limon for Mohs. 2. Neoplasm of uncertain behavior of skin,   A. Right nasal ala - r/o BCC  B. Left medial lower cheek - r/o BCC    Discussed possible diagnosis; patient agreeable to biopsies (verbal consent obtained). The area(s) to be biopsied were marked with a surgical pen. Alcohol was used to cleanse the site. Local anesthesia was acheived with 1% lidocaine with epinephrine. Shave biopsy was performed x 2 using a razor blade. Hemostasis was achieved with aluminum chloride. The wound(s) were dressed with petrolatum and covered with a bandage. Wound care instructions were reviewed. 2 Specimen (s) sent to pathology. The specimen bottles were appropriately labeled. We also reviewed the risks of bleeding, scar, and infection. Return in about 4 months (around 2/19/2021).

## 2020-10-20 ENCOUNTER — TELEPHONE (OUTPATIENT)
Dept: DERMATOLOGY | Age: 85
End: 2020-10-20

## 2020-10-21 LAB — DERMATOLOGY PATHOLOGY REPORT: ABNORMAL

## 2020-10-23 ENCOUNTER — TELEPHONE (OUTPATIENT)
Dept: DERMATOLOGY | Age: 85
End: 2020-10-23

## 2020-10-23 NOTE — TELEPHONE ENCOUNTER
Pt calling states her face has stopped bleeding wanted  to know any further questions pls return call back @ [phone] to discuss

## 2020-11-04 ENCOUNTER — TELEPHONE (OUTPATIENT)
Dept: SURGERY | Age: 85
End: 2020-11-04

## 2020-11-04 NOTE — TELEPHONE ENCOUNTER
I finally reached pt to discuss scheduling Mohs on 3 sites. She wants to wait until January to complete the 2 - R central FH & L upper FH; this appt is 1/11 at 8:30. I talked her into scheduling the R lower [place] for 12/2 at 9 a.m. She said she may cancel because she's not sure if she wants to have this treated.

## 2020-11-16 ENCOUNTER — TELEPHONE (OUTPATIENT)
Dept: SURGERY | Age: 85
End: 2020-11-16

## 2021-01-28 ENCOUNTER — TELEPHONE (OUTPATIENT)
Dept: SURGERY | Age: 86
End: 2021-01-28

## 2021-02-24 ENCOUNTER — PROCEDURE VISIT (OUTPATIENT)
Dept: SURGERY | Age: 86
End: 2021-02-24
Payer: MEDICARE

## 2021-02-24 VITALS — TEMPERATURE: 96.1 F | SYSTOLIC BLOOD PRESSURE: 149 MMHG | DIASTOLIC BLOOD PRESSURE: 82 MMHG | HEART RATE: 82 BPM

## 2021-02-24 DIAGNOSIS — C44.319 BASAL CELL CARCINOMA OF FOREHEAD: ICD-10-CM

## 2021-02-24 DIAGNOSIS — C44.319 BASAL CELL CARCINOMA (BCC) OF FOREHEAD: Primary | ICD-10-CM

## 2021-02-24 PROCEDURE — 17311 MOHS 1 STAGE H/N/HF/G: CPT | Performed by: DERMATOLOGY

## 2021-02-24 PROCEDURE — 12053 INTMD RPR FACE/MM 5.1-7.5 CM: CPT | Performed by: DERMATOLOGY

## 2021-02-24 RX ORDER — FUROSEMIDE 20 MG/1
20 TABLET ORAL 2 TIMES DAILY
COMMUNITY
End: 2022-07-06

## 2021-02-24 NOTE — PROGRESS NOTES
skin, using the technique of Mohs. A map was prepared to correspond to the area of skin from which it was excised. Hemostasis was achieved using electrosurgery. The wound was bandaged. The tissue was prepared for the cryostat and sectioned. 1 section(s) prepared. Each section was coded, cut, and stained for microscopic examination. The entire base and margins of the excised piece of tissue were examined by the surgeon. The tissue was examined to the level of subcut fat. No tumor was identified at the peripheral margins of stage I of microscopically controlled surgery. DEFECT MANAGEMENT:    REPAIR DESCRIPTION:  Various closure modalities were discussed with the patient, and it was decided that an intermediate layered repair would best preserve normal anatomic and functional relationships. Additional risk of wound dehiscence was discussed. The area was anesthetized with 1% lidocaine with epinephrine 1:100,000 buffered, was given a sterile prep using Chlorhexidine gluconate 4% solution and draped in the usual sterile fashion. Recreation and enlargement of the wound was performed by excising cones of tissue via the triangulation technique. The final incision lines were placed with respect for the patient's natural skin tension lines in a linear configuration to avoid functional and aesthetic distortion of adjacent free margins. Following minimal undermining, meticulous hemostasis was obtained with spot monopolar electrocoagulation. Subcutaneous dead space and dermis were closed using 5-0 PDS buried subcutaneous interrupted suture and the epidermis was approximated with 6-0 Prolene running epidermal sutures. WOUND COVERAGE:  The wound was cleaned with normal saline solution, dried off, Aquaphor ointment was applied, and the wound was covered. A dressing was applied for stabilization and light pressure.   The patient was given detailed oral and written instructions on postoperative care.       There were no complications. The patient left the Unit in good medical condition. FOLLOW-UP:  The patient will return for suture removal in 7 days.

## 2021-02-24 NOTE — PROGRESS NOTES
using the technique of Mohs. A map was prepared to correspond to the area of skin from which it was excised. Hemostasis was achieved using electrosurgery. The wound was bandaged. The tissue was prepared for the cryostat and sectioned. 1 section(s) prepared. Each section was coded, cut, and stained for microscopic examination. The entire base and margins of the excised piece of tissue were examined by the surgeon. The tissue was examined to the level of subcut fat. No tumor was identified at the peripheral margins of stage I of microscopically controlled surgery. DEFECT MANAGEMENT:    REPAIR DESCRIPTION:  Various closure modalities were discussed with the patient, and it was decided that an intermediate layered repair would best preserve normal anatomic and functional relationships. Additional risk of wound dehiscence was discussed. The area was anesthetized with 1% lidocaine with epinephrine 1:100,000 buffered, was given a sterile prep using Chlorhexidine gluconate 4% solution and draped in the usual sterile fashion. Recreation and enlargement of the wound was performed by excising cones of tissue via the triangulation technique. The final incision lines were placed with respect for the patient's natural skin tension lines in a linear configuration to avoid functional and aesthetic distortion of adjacent free margins. Following minimal undermining, meticulous hemostasis was obtained with spot monopolar electrocoagulation. Subcutaneous dead space and dermis were closed using 5-0 PDS buried subcutaneous interrupted suture and the epidermis was approximated with 6-0 Prolene running epidermal sutures . WOUND COVERAGE:  The wound was cleaned with normal saline solution, dried off, Aquaphor ointment was applied, and the wound was covered. A dressing was applied for stabilization and light pressure.   The patient was given detailed oral and written instructions on postoperative care.       There were no complications. The patient left the Unit in good medical condition. FOLLOW-UP:  The patient will return for suture removal in 7 days.

## 2021-02-24 NOTE — PROGRESS NOTES
PRE-PROCEDURE SCREENING    Pacemaker/ICD: No  Difficulty with numbing in the past: No  Local Anesthesia Reaction/passing out: No  Latex or adhesive allergy:  No  Bleeding/Clotting Disorders: No  Anticoagulant Therapy: Yes Aspirin 81 mg daily  Joint prosthesis: Yes - Aubrey   TKR (several years ago aubrey)  Artificial Heart Valve: No  Stroke or Seizures: No  Organ Transplant or Lymphoma: No  Immunosuppression: No  Respiratory Problems: No

## 2021-02-24 NOTE — PATIENT INSTRUCTIONS
[facility] Office Hours:    Monday-Thursday  7:30 AM-4:30 PM    Friday  9:00 AM-1:00 PM      POST-OPERATIVE CARE FOR STICHES  Bandage change after 48 hours    CARING FOR YOUR SURGICAL SITE  The bandage should remain on and completely dry for 48 hours. Do NOT get the bandage wet. 1. After the first 48 hours, gently remove the remaining part of the bandage. It can be helpful to moisten the bandage edges in the shower. Steri strips may still be on the wound. It is ok, they will fall off slowly with the daily bandage changes. 2. Gently clean the wound daily with mild soap and water. Try to clean off crust and debris. 3. Dry (pat) the area with a clean Q-tip or gauze. 4. Apply a layer of Vaseline/ Aquaphor (or Bacitracin if your doctor recommends) to the wound area only. 5. Cut a piece of Telfa (or any non-stick dressing) to fit just over the wound and secure it with paper tape. If the wound is small you may use a Band- Aid. Keep area covered for a total of 1 week(s). If the dressing comes off or if you have questions, or concerns about the dressing, please call the office for instructions! POST OPERATIVE INSTRUCTIONS    1. Activity: Do not lift anything heavier than a gallon of milk for 1 week. Also, avoid strenuous activity such as running, power walking or contact sports. 2. Eating and drinking: Do not drink alcohol for 48 hours after your procedure. Alcohol increases the chances of bleeding. 3. Medicines   -If you have discomfort, take Acetaminophen (Tylenol or Extra Strength Tylenol). Follow the instructions and warning on the bottle. -If your doctor has prescribed you an Aspirin daily, please keep taking it. Do not take extra Aspirin or medicines containing Aspirin (such as [place] and Excedrin) for 48 hours after your procedure. Bleeding: If bleeding occurs, DO NOT remove the bandage. Put firm pressure on the area with gauze for 20 minutes without peeking.  If the bleeding continues, apply pressure for another 20 minutes. If the bleeding does not stop after you apply pressure, call us right away. If you can not call, go to the nearest emergency room or urgent care facility. What to expect:  You may have these symptoms. They are normal and should get better with time:  1. Swelling. Swelling usually increases for the first 48 hours after your procedure and then begins to improve. Some soreness and redness around your wound. If we worked close to your eyes (forehead, nose, temple, or upper cheeks) your eyes may become swollen and/ or black and blue. 2. Bruising, which could last 1 week or more. 3. Pink and bumpy appearance to the scar. This may happen a few weeks after your procedure. After 4 weeks, you may gently massage the area each day with facial moisturizer or petroleum jelly (Vaseline or Aquaphor). This will help to smooth the skin and improve the appearance of the scar. The color of your scar will fade over time, but it may be pink for several months after the procedure. The scar may take 6 months to 1 year to reach its final color and appearance. 4. \"Spitting\" suture. Occasionally, an inside suture (stitch) does not completely dissolve. When this happens, (generally 4-8 weeks after surgery), it causes a bump or \"pimple\" to form on the scar. This is easily removed and is not at all serious. It does not mean the skin cancer has returned. Contact us if it happens, but do not be alarmed. Vitamin E oil is NOT necessary. A good moisturizer is just as effective. Sunscreen IS necessary. Use at least and SPF 30 sunscreen daily- even in winter    Call us at [phone] right away if you have any of the following symptoms:  -Bleeding that you can not stop (see highlighted area above). -Pain that lasts longer than 48 hours.  -Your wound becomes more painful, red or hot.   -Bruising and swelling that does not begin to improve within the 48 hours or gets worse suddenly.

## 2021-02-25 ENCOUNTER — TELEPHONE (OUTPATIENT)
Dept: SURGERY | Age: 86
End: 2021-02-25

## 2021-02-25 NOTE — TELEPHONE ENCOUNTER
The patient was in the office on 2/24/2021 for mohs located on the left upper forehead and ILC and right central forehead  with ILC repair. The patient tolerated the procedure well and left the office in good condition. Pain level on post-operative day 1:  None (headache yesterday)    Any bleeding episode that required pressure to be held, bandage change or a call to the office or MD?  no     Any other issues?:  no    A post-operative telephone call was placed at 2:46pm in order to check on the patient's recovery process. The patient reported doing well and had no complaints other than those listed above, if any. All of the patient's questions were answered.

## 2021-03-03 ENCOUNTER — OFFICE VISIT (OUTPATIENT)
Dept: SURGERY | Age: 86
End: 2021-03-03

## 2021-03-03 DIAGNOSIS — Z48.02 VISIT FOR SUTURE REMOVAL: Primary | ICD-10-CM

## 2021-03-03 PROCEDURE — 99024 POSTOP FOLLOW-UP VISIT: CPT | Performed by: DERMATOLOGY

## 2021-03-03 NOTE — PATIENT INSTRUCTIONS
[facility] Office Hours:    Monday-Thursday  7:30 AM-4:30 PM    Friday  9:00 AM-1:00 PM    WOUND CARE AFTER SUTURE REMOVAL    After your stiches have been removed, your scar is still very fragile. In fact, scars continue to change and evolve, what we call remodel, for about a year after your procedure. Follow the following steps below to ensure that your scar heals well. Instructions    1. If Steri-strips were applied, keep them on until they fall off on their own. 2. Protect your scar from the sun. Use a sunscreen or bandage to cover your scar. Abraham Dryer exposure can cause your scar to become discolored and appear red or brown. 3. To help soften your scar more rapidly, it is helpful, but not necessary, for you to   massage the scar gently each night for twenty minutes. 4. Spitting suture. Occasionally, an inside suture (stitch) does not completely dissolve. When this happens, (generally 4-8 weeks after surgery), it causes a bump or pimple to form on the scar. This is easily removed and is not at all serious. It   does not mean the skin cancer has returned. Contact us if it happens, but do not be alarmed. 5. If you scar becomes tender, itchy or becomes very large, let Dr. Slim Ugarte know. There    are treatments that can improve the appearance of your scar or help make it more comfortable.

## 2021-03-04 NOTE — PROGRESS NOTES
S:  The patient is here for suture removal s/p Mohs surgery on the forehead x 2 and Intermediate layered closure repair, 1 week(s) ago. The site appears well-healed without signs of infection (redness, pain or discharge). The sutures were removed. Both sites look very good. Wound care and activity instructions given. The patient was scheduled for follow-up as needed for scar/wound check. The patient was scheduled for f/u with General Dermatology per their instructions.

## 2022-07-06 ENCOUNTER — OFFICE VISIT (OUTPATIENT)
Dept: DERMATOLOGY | Age: 87
End: 2022-07-06
Payer: COMMERCIAL

## 2022-07-06 DIAGNOSIS — C44.310 BCC (BASAL CELL CARCINOMA), FACE: Primary | ICD-10-CM

## 2022-07-06 DIAGNOSIS — L72.0 MILIA: ICD-10-CM

## 2022-07-06 DIAGNOSIS — L57.0 AK (ACTINIC KERATOSIS): ICD-10-CM

## 2022-07-06 PROCEDURE — 17000 DESTRUCT PREMALG LESION: CPT | Performed by: DERMATOLOGY

## 2022-07-06 PROCEDURE — 1123F ACP DISCUSS/DSCN MKR DOCD: CPT | Performed by: DERMATOLOGY

## 2022-07-06 PROCEDURE — 99212 OFFICE O/P EST SF 10 MIN: CPT | Performed by: DERMATOLOGY

## 2022-07-06 RX ORDER — PSEUDOEPHEDRINE HCL 30 MG
200 TABLET ORAL 2 TIMES DAILY
COMMUNITY

## 2022-07-06 NOTE — PROGRESS NOTES
[facility]  Bridget Hale MD  [phone]      Clyde Duran  5/16/1929    80 y.o. female     Date of Visit: 7/6/2022    Chief Complaint: skin lesions    History of Present Illness:    1. Has several untreated BCCs on the face (right nasal ala, left medial lower cheek, right lower temple, right lower cheek). They remain asymptomatic and are not bothersome. She does not want any more surgeries or treatment. 2.  She reports a persistent scaly lesion on the left cheek. 3.  She complains of few asymptomatic lesions on the chin. Derm Hx:    Nodular BCC of the right nasal ala and left lower medial cheek- both biopsied in October 2020, referred to Dr. Comfort Young for Mohs but declined to pursue treatment. Nodular BCC of the left upper forehead, right central forehead both treated with Mohs by Dr. Comfort Young on 2/24/2021. Right central frontal scalp-   Basal Cell Carcinoma, Nodular Type -treated with Mohs by Dr. Comfort Young on 1/27/2020. Right superior temple-   Basal Cell Carcinoma, Nodular Type -treated with Mohs by Dr. Comfort Young on 8/20/2019. E. Right lower temple-   Basal Cell Carcinoma, Nodular Type     F. Right lower cheek-   Basal Cell Carcinoma, Nodular Type     Right nasal tip-   Basal Cell Carcinoma, Nodular Type -treated with Mohs on 12/10/2019. Left nasal dorsum-   Basal cell carcinoma, focally infiltrating - treated with Mohs by Dr. Comfort Young on 8/13/2019. Review of Systems:  Gen: Feels well, good sense of health. Past Medical History, Family History, Surgical History, Medications and Allergies reviewed.     Past Medical History:   Diagnosis Date    Basal cell carcinoma     Hand swelling     Hyperlipidemia     Hypertension     Hypothyroidism     Leg swelling      Past Surgical History:   Procedure Laterality Date    JOINT REPLACEMENT  2010    rt knee replacement    JOINT REPLACEMENT  2014    L knee    MOHS SURGERY  08/20/2019    R temple    MOHS SURGERY Right 12/10/2019    [facility] right nasal tip        No Known Allergies  Outpatient Medications Marked as Taking for the 7/6/22 encounter (Office Visit) with Antonia Espitia MD   Medication Sig Dispense Refill    docusate (COLACE, DULCOLAX) 100 MG CAPS Take 200 mg by mouth 2 times daily      vitamin C (ASCORBIC ACID) 500 MG tablet Take 1,000 mg by mouth daily Does not take daily per PT      Multiple Vitamin (MULTI VITAMIN DAILY PO) Take by mouth daily Does not take daily per PT      aspirin 81 MG tablet Take 81 mg by mouth daily.  amitriptyline (ELAVIL) 25 MG tablet       traZODone (DESYREL) 50 MG tablet       traMADol (ULTRAM) 50 MG tablet       lisinopril-hydrochlorothiazide (PRINZIDE;ZESTORETIC) 20-12.5 MG per tablet       levothyroxine (SYNTHROID) 150 MCG tablet            Physical Examination       Well appearing. 1.  Right lower temple - 1.5 cm telangiectatic pink patch. Right nasal ala, right medial lower cheek with ill-defined telangiectatic skin colored to pink pearly papules. Left medial lower cheek clear. Left upper lip at the vermilion border with an ill-defined telangiectatic papule. 2.  Left superior lateral cheek with hyperkeratotic erythematous macule. 3.  Chin and lower cutaneous lip with several 1 to 2 mm whitish papules. Assessment and Plan     1. BCCs (basal cell carcinoma) of the face     She understands that the skin lesions are skin cancers but due to the slow-growing nature of the lesions and her advanced age, she prefers to avoid any treatments unless they become bothersome. She understands risks with this course of action. 2. AK (actinic keratosis)     Cryotherapy was discussed and patient agreed to proceed. Consent was obtained. 1 lesions were treated cryotherapy: left superolateral cheek. 2 cycles of liquid nitrogen applied to each lesion for 5 seconds using a Cry-Ac cryo spray gun.   Patient was educated regarding the potential risks of blister formation and discomfort. Wound care was discussed. The patient tolerated the procedure well and there were no immediate complications. 3. Eriberto     Reassprakash.           --Antonia Espitia MD

## 2024-01-17 ENCOUNTER — APPOINTMENT (OUTPATIENT)
Dept: GENERAL RADIOLOGY | Age: 89
DRG: 291 | End: 2024-01-17
Payer: MEDICARE

## 2024-01-17 ENCOUNTER — HOSPITAL ENCOUNTER (INPATIENT)
Age: 89
LOS: 4 days | Discharge: HOSPICE | DRG: 291 | End: 2024-01-21
Attending: EMERGENCY MEDICINE | Admitting: STUDENT IN AN ORGANIZED HEALTH CARE EDUCATION/TRAINING PROGRAM
Payer: MEDICARE

## 2024-01-17 ENCOUNTER — APPOINTMENT (OUTPATIENT)
Dept: CT IMAGING | Age: 89
DRG: 291 | End: 2024-01-17
Payer: MEDICARE

## 2024-01-17 DIAGNOSIS — R09.02 HYPOXIA: ICD-10-CM

## 2024-01-17 DIAGNOSIS — M79.605 LEFT LEG PAIN: ICD-10-CM

## 2024-01-17 DIAGNOSIS — E87.70 HYPERVOLEMIA, UNSPECIFIED HYPERVOLEMIA TYPE: Primary | ICD-10-CM

## 2024-01-17 PROBLEM — I50.9 ACUTE DECOMPENSATED HEART FAILURE (HCC): Status: ACTIVE | Noted: 2024-01-17

## 2024-01-17 LAB
AMORPH SED URNS QL MICRO: ABNORMAL /HPF
ANION GAP SERPL CALCULATED.3IONS-SCNC: 15 MMOL/L (ref 3–16)
BACTERIA URNS QL MICRO: ABNORMAL /HPF
BASE EXCESS BLDV CALC-SCNC: -5.8 MMOL/L (ref -2–3)
BASOPHILS # BLD: 0 K/UL (ref 0–0.2)
BASOPHILS NFR BLD: 0.5 %
BILIRUB UR QL STRIP.AUTO: NEGATIVE
BUN SERPL-MCNC: 63 MG/DL (ref 7–20)
CALCIUM SERPL-MCNC: 9.4 MG/DL (ref 8.3–10.6)
CHLORIDE SERPL-SCNC: 104 MMOL/L (ref 99–110)
CLARITY UR: CLEAR
CO2 BLDV-SCNC: 22 MMOL/L
CO2 SERPL-SCNC: 17 MMOL/L (ref 21–32)
COHGB MFR BLDV: 1.5 % (ref 0–1.5)
COLOR UR: YELLOW
CREAT SERPL-MCNC: 2.5 MG/DL (ref 0.6–1.2)
DEPRECATED RDW RBC AUTO: 17.5 % (ref 12.4–15.4)
DO-HGB MFR BLDV: 49.1 %
EKG ATRIAL RATE: 86 BPM
EKG DIAGNOSIS: NORMAL
EKG P AXIS: 57 DEGREES
EKG P-R INTERVAL: 196 MS
EKG Q-T INTERVAL: 392 MS
EKG QRS DURATION: 100 MS
EKG QTC CALCULATION (BAZETT): 469 MS
EKG R AXIS: 34 DEGREES
EKG T AXIS: 15 DEGREES
EKG VENTRICULAR RATE: 86 BPM
EOSINOPHIL # BLD: 0.1 K/UL (ref 0–0.6)
EOSINOPHIL NFR BLD: 1.1 %
EPI CELLS #/AREA URNS HPF: ABNORMAL /HPF (ref 0–5)
FLUAV RNA RESP QL NAA+PROBE: NOT DETECTED
FLUBV RNA RESP QL NAA+PROBE: NOT DETECTED
GFR SERPLBLD CREATININE-BSD FMLA CKD-EPI: 17 ML/MIN/{1.73_M2}
GLUCOSE SERPL-MCNC: 102 MG/DL (ref 70–99)
GLUCOSE UR STRIP.AUTO-MCNC: NEGATIVE MG/DL
HCO3 BLDV-SCNC: 20.6 MMOL/L (ref 24–28)
HCT VFR BLD AUTO: 34.5 % (ref 36–48)
HGB BLD-MCNC: 11 G/DL (ref 12–16)
HGB UR QL STRIP.AUTO: ABNORMAL
KETONES UR STRIP.AUTO-MCNC: NEGATIVE MG/DL
LACTATE BLDV-SCNC: 0.8 MMOL/L (ref 0.4–2)
LEUKOCYTE ESTERASE UR QL STRIP.AUTO: NEGATIVE
LYMPHOCYTES # BLD: 0.5 K/UL (ref 1–5.1)
LYMPHOCYTES NFR BLD: 7.6 %
MCH RBC QN AUTO: 27 PG (ref 26–34)
MCHC RBC AUTO-ENTMCNC: 32 G/DL (ref 31–36)
MCV RBC AUTO: 84.2 FL (ref 80–100)
METHGB MFR BLDV: 0.3 % (ref 0–1.5)
MONOCYTES # BLD: 0.4 K/UL (ref 0–1.3)
MONOCYTES NFR BLD: 5.7 %
NEUTROPHILS # BLD: 5.4 K/UL (ref 1.7–7.7)
NEUTROPHILS NFR BLD: 85.1 %
NITRITE UR QL STRIP.AUTO: NEGATIVE
NT-PROBNP SERPL-MCNC: 2311 PG/ML (ref 0–449)
PCO2 BLDV: 42.9 MMHG (ref 41–51)
PH BLDV: 7.29 [PH] (ref 7.35–7.45)
PH UR STRIP.AUTO: 6.5 [PH] (ref 5–8)
PLATELET # BLD AUTO: 244 K/UL (ref 135–450)
PMV BLD AUTO: 7.9 FL (ref 5–10.5)
PO2 BLDV: <30 MMHG (ref 25–40)
POTASSIUM SERPL-SCNC: 4.4 MMOL/L (ref 3.5–5.1)
POTASSIUM SERPL-SCNC: 5.6 MMOL/L (ref 3.5–5.1)
PROT UR STRIP.AUTO-MCNC: >=300 MG/DL
RBC # BLD AUTO: 4.09 M/UL (ref 4–5.2)
RBC #/AREA URNS HPF: ABNORMAL /HPF (ref 0–4)
SAO2 % BLDV: 50 %
SARS-COV-2 RNA RESP QL NAA+PROBE: NOT DETECTED
SODIUM SERPL-SCNC: 136 MMOL/L (ref 136–145)
SP GR UR STRIP.AUTO: 1.02 (ref 1–1.03)
TROPONIN, HIGH SENSITIVITY: 34 NG/L (ref 0–14)
TROPONIN, HIGH SENSITIVITY: 35 NG/L (ref 0–14)
TROPONIN, HIGH SENSITIVITY: 37 NG/L (ref 0–14)
UA COMPLETE W REFLEX CULTURE PNL UR: ABNORMAL
UA DIPSTICK W REFLEX MICRO PNL UR: YES
URN SPEC COLLECT METH UR: ABNORMAL
UROBILINOGEN UR STRIP-ACNC: 0.2 E.U./DL
WBC # BLD AUTO: 6.4 K/UL (ref 4–11)
WBC #/AREA URNS HPF: ABNORMAL /HPF (ref 0–5)

## 2024-01-17 PROCEDURE — 6370000000 HC RX 637 (ALT 250 FOR IP): Performed by: STUDENT IN AN ORGANIZED HEALTH CARE EDUCATION/TRAINING PROGRAM

## 2024-01-17 PROCEDURE — 83605 ASSAY OF LACTIC ACID: CPT

## 2024-01-17 PROCEDURE — 80048 BASIC METABOLIC PNL TOTAL CA: CPT

## 2024-01-17 PROCEDURE — 84484 ASSAY OF TROPONIN QUANT: CPT

## 2024-01-17 PROCEDURE — 6360000004 HC RX CONTRAST MEDICATION: Performed by: EMERGENCY MEDICINE

## 2024-01-17 PROCEDURE — 71045 X-RAY EXAM CHEST 1 VIEW: CPT

## 2024-01-17 PROCEDURE — 87636 SARSCOV2 & INF A&B AMP PRB: CPT

## 2024-01-17 PROCEDURE — 82803 BLOOD GASES ANY COMBINATION: CPT

## 2024-01-17 PROCEDURE — 2060000000 HC ICU INTERMEDIATE R&B

## 2024-01-17 PROCEDURE — 93005 ELECTROCARDIOGRAM TRACING: CPT | Performed by: EMERGENCY MEDICINE

## 2024-01-17 PROCEDURE — 75635 CT ANGIO ABDOMINAL ARTERIES: CPT

## 2024-01-17 PROCEDURE — 84132 ASSAY OF SERUM POTASSIUM: CPT

## 2024-01-17 PROCEDURE — 99285 EMERGENCY DEPT VISIT HI MDM: CPT

## 2024-01-17 PROCEDURE — 2700000000 HC OXYGEN THERAPY PER DAY

## 2024-01-17 PROCEDURE — 73030 X-RAY EXAM OF SHOULDER: CPT

## 2024-01-17 PROCEDURE — 6360000002 HC RX W HCPCS: Performed by: EMERGENCY MEDICINE

## 2024-01-17 PROCEDURE — 94761 N-INVAS EAR/PLS OXIMETRY MLT: CPT

## 2024-01-17 PROCEDURE — 36415 COLL VENOUS BLD VENIPUNCTURE: CPT

## 2024-01-17 PROCEDURE — 2580000003 HC RX 258: Performed by: STUDENT IN AN ORGANIZED HEALTH CARE EDUCATION/TRAINING PROGRAM

## 2024-01-17 PROCEDURE — 83880 ASSAY OF NATRIURETIC PEPTIDE: CPT

## 2024-01-17 PROCEDURE — 6360000002 HC RX W HCPCS: Performed by: STUDENT IN AN ORGANIZED HEALTH CARE EDUCATION/TRAINING PROGRAM

## 2024-01-17 PROCEDURE — 73502 X-RAY EXAM HIP UNI 2-3 VIEWS: CPT

## 2024-01-17 PROCEDURE — 81001 URINALYSIS AUTO W/SCOPE: CPT

## 2024-01-17 PROCEDURE — 6370000000 HC RX 637 (ALT 250 FOR IP): Performed by: EMERGENCY MEDICINE

## 2024-01-17 PROCEDURE — 85025 COMPLETE CBC W/AUTO DIFF WBC: CPT

## 2024-01-17 PROCEDURE — 96374 THER/PROPH/DIAG INJ IV PUSH: CPT

## 2024-01-17 RX ORDER — FUROSEMIDE 10 MG/ML
20 INJECTION INTRAMUSCULAR; INTRAVENOUS ONCE
Status: DISCONTINUED | OUTPATIENT
Start: 2024-01-17 | End: 2024-01-17

## 2024-01-17 RX ORDER — FUROSEMIDE 10 MG/ML
20 INJECTION INTRAMUSCULAR; INTRAVENOUS ONCE
Status: COMPLETED | OUTPATIENT
Start: 2024-01-17 | End: 2024-01-17

## 2024-01-17 RX ORDER — LEVOTHYROXINE SODIUM 0.15 MG/1
150 TABLET ORAL DAILY
Status: DISCONTINUED | OUTPATIENT
Start: 2024-01-17 | End: 2024-01-21 | Stop reason: HOSPADM

## 2024-01-17 RX ORDER — POLYETHYLENE GLYCOL 3350 17 G/17G
17 POWDER, FOR SOLUTION ORAL DAILY PRN
Status: DISCONTINUED | OUTPATIENT
Start: 2024-01-17 | End: 2024-01-21 | Stop reason: HOSPADM

## 2024-01-17 RX ORDER — SODIUM BICARBONATE 650 MG/1
650 TABLET ORAL 3 TIMES DAILY
Status: DISCONTINUED | OUTPATIENT
Start: 2024-01-17 | End: 2024-01-19

## 2024-01-17 RX ORDER — ACETAMINOPHEN 500 MG
1000 TABLET ORAL
Status: COMPLETED | OUTPATIENT
Start: 2024-01-17 | End: 2024-01-17

## 2024-01-17 RX ORDER — ONDANSETRON 2 MG/ML
4 INJECTION INTRAMUSCULAR; INTRAVENOUS EVERY 6 HOURS PRN
Status: DISCONTINUED | OUTPATIENT
Start: 2024-01-17 | End: 2024-01-21 | Stop reason: HOSPADM

## 2024-01-17 RX ORDER — ONDANSETRON 4 MG/1
4 TABLET, ORALLY DISINTEGRATING ORAL EVERY 8 HOURS PRN
Status: DISCONTINUED | OUTPATIENT
Start: 2024-01-17 | End: 2024-01-21 | Stop reason: HOSPADM

## 2024-01-17 RX ORDER — OXYCODONE HYDROCHLORIDE 5 MG/1
5 TABLET ORAL EVERY 4 HOURS PRN
Status: DISCONTINUED | OUTPATIENT
Start: 2024-01-17 | End: 2024-01-20

## 2024-01-17 RX ORDER — FUROSEMIDE 20 MG/1
20 TABLET ORAL DAILY
Status: ON HOLD | COMMUNITY
End: 2024-01-21 | Stop reason: HOSPADM

## 2024-01-17 RX ORDER — NIFEDIPINE 30 MG/1
30 TABLET, FILM COATED, EXTENDED RELEASE ORAL DAILY
Status: DISCONTINUED | OUTPATIENT
Start: 2024-01-17 | End: 2024-01-21 | Stop reason: HOSPADM

## 2024-01-17 RX ORDER — SODIUM CHLORIDE 9 MG/ML
INJECTION, SOLUTION INTRAVENOUS PRN
Status: DISCONTINUED | OUTPATIENT
Start: 2024-01-17 | End: 2024-01-21 | Stop reason: HOSPADM

## 2024-01-17 RX ORDER — FUROSEMIDE 10 MG/ML
20 INJECTION INTRAMUSCULAR; INTRAVENOUS 2 TIMES DAILY
Status: DISCONTINUED | OUTPATIENT
Start: 2024-01-18 | End: 2024-01-18

## 2024-01-17 RX ORDER — HEPARIN SODIUM 5000 [USP'U]/ML
5000 INJECTION, SOLUTION INTRAVENOUS; SUBCUTANEOUS EVERY 8 HOURS SCHEDULED
Status: DISCONTINUED | OUTPATIENT
Start: 2024-01-17 | End: 2024-01-21 | Stop reason: HOSPADM

## 2024-01-17 RX ORDER — HYDRALAZINE HYDROCHLORIDE 25 MG/1
25 TABLET, FILM COATED ORAL EVERY 8 HOURS PRN
Status: DISCONTINUED | OUTPATIENT
Start: 2024-01-17 | End: 2024-01-21 | Stop reason: HOSPADM

## 2024-01-17 RX ORDER — SODIUM CHLORIDE 0.9 % (FLUSH) 0.9 %
5-40 SYRINGE (ML) INJECTION PRN
Status: DISCONTINUED | OUTPATIENT
Start: 2024-01-17 | End: 2024-01-21 | Stop reason: HOSPADM

## 2024-01-17 RX ORDER — NIFEDIPINE 30 MG/1
30 TABLET, FILM COATED, EXTENDED RELEASE ORAL DAILY
Status: ON HOLD | COMMUNITY
End: 2024-01-21 | Stop reason: HOSPADM

## 2024-01-17 RX ORDER — LABETALOL HYDROCHLORIDE 5 MG/ML
10 INJECTION, SOLUTION INTRAVENOUS EVERY 4 HOURS PRN
Status: DISCONTINUED | OUTPATIENT
Start: 2024-01-17 | End: 2024-01-17

## 2024-01-17 RX ORDER — ACETAMINOPHEN 650 MG/1
650 SUPPOSITORY RECTAL EVERY 6 HOURS PRN
Status: DISCONTINUED | OUTPATIENT
Start: 2024-01-17 | End: 2024-01-20

## 2024-01-17 RX ORDER — TRAZODONE HYDROCHLORIDE 50 MG/1
50 TABLET ORAL NIGHTLY PRN
Status: DISCONTINUED | OUTPATIENT
Start: 2024-01-17 | End: 2024-01-19

## 2024-01-17 RX ORDER — DIAZEPAM 5 MG/1
5 TABLET ORAL ONCE
Status: COMPLETED | OUTPATIENT
Start: 2024-01-17 | End: 2024-01-17

## 2024-01-17 RX ORDER — SODIUM CHLORIDE 0.9 % (FLUSH) 0.9 %
5-40 SYRINGE (ML) INJECTION EVERY 12 HOURS SCHEDULED
Status: DISCONTINUED | OUTPATIENT
Start: 2024-01-17 | End: 2024-01-21 | Stop reason: HOSPADM

## 2024-01-17 RX ORDER — OXYCODONE HYDROCHLORIDE 5 MG/1
5 TABLET ORAL ONCE
Status: COMPLETED | OUTPATIENT
Start: 2024-01-17 | End: 2024-01-17

## 2024-01-17 RX ORDER — ASPIRIN 81 MG/1
81 TABLET, CHEWABLE ORAL DAILY
Status: DISCONTINUED | OUTPATIENT
Start: 2024-01-17 | End: 2024-01-21 | Stop reason: HOSPADM

## 2024-01-17 RX ORDER — ACETAMINOPHEN 325 MG/1
650 TABLET ORAL EVERY 6 HOURS PRN
Status: DISCONTINUED | OUTPATIENT
Start: 2024-01-17 | End: 2024-01-20

## 2024-01-17 RX ORDER — BUTALBITAL, ACETAMINOPHEN AND CAFFEINE 50; 325; 40 MG/1; MG/1; MG/1
1 TABLET ORAL EVERY 4 HOURS PRN
Status: DISCONTINUED | OUTPATIENT
Start: 2024-01-17 | End: 2024-01-21 | Stop reason: HOSPADM

## 2024-01-17 RX ADMIN — ACETAMINOPHEN 1000 MG: 500 TABLET ORAL at 07:36

## 2024-01-17 RX ADMIN — NIFEDIPINE 30 MG: 30 TABLET, EXTENDED RELEASE ORAL at 17:43

## 2024-01-17 RX ADMIN — ASPIRIN 81 MG: 81 TABLET, CHEWABLE ORAL at 17:43

## 2024-01-17 RX ADMIN — HEPARIN SODIUM 5000 UNITS: 5000 INJECTION INTRAVENOUS; SUBCUTANEOUS at 17:43

## 2024-01-17 RX ADMIN — HEPARIN SODIUM 5000 UNITS: 5000 INJECTION INTRAVENOUS; SUBCUTANEOUS at 23:15

## 2024-01-17 RX ADMIN — DIAZEPAM 5 MG: 5 TABLET ORAL at 08:54

## 2024-01-17 RX ADMIN — LEVOTHYROXINE SODIUM 150 MCG: 150 TABLET ORAL at 17:43

## 2024-01-17 RX ADMIN — HYDRALAZINE HYDROCHLORIDE 25 MG: 25 TABLET, FILM COATED ORAL at 19:10

## 2024-01-17 RX ADMIN — SODIUM BICARBONATE 650 MG: 650 TABLET ORAL at 20:55

## 2024-01-17 RX ADMIN — IOPAMIDOL 120 ML: 755 INJECTION, SOLUTION INTRAVENOUS at 10:32

## 2024-01-17 RX ADMIN — FUROSEMIDE 20 MG: 10 INJECTION, SOLUTION INTRAMUSCULAR; INTRAVENOUS at 12:15

## 2024-01-17 RX ADMIN — OXYCODONE HYDROCHLORIDE 5 MG: 5 TABLET ORAL at 17:27

## 2024-01-17 RX ADMIN — SODIUM CHLORIDE, PRESERVATIVE FREE 5 ML: 5 INJECTION INTRAVENOUS at 20:55

## 2024-01-17 RX ADMIN — OXYCODONE HYDROCHLORIDE 5 MG: 5 TABLET ORAL at 08:15

## 2024-01-17 ASSESSMENT — PAIN DESCRIPTION - ORIENTATION
ORIENTATION: RIGHT
ORIENTATION: RIGHT
ORIENTATION: LEFT

## 2024-01-17 ASSESSMENT — PAIN - FUNCTIONAL ASSESSMENT
PAIN_FUNCTIONAL_ASSESSMENT: PREVENTS OR INTERFERES SOME ACTIVE ACTIVITIES AND ADLS
PAIN_FUNCTIONAL_ASSESSMENT: 0-10
PAIN_FUNCTIONAL_ASSESSMENT: ACTIVITIES ARE NOT PREVENTED
PAIN_FUNCTIONAL_ASSESSMENT: PREVENTS OR INTERFERES SOME ACTIVE ACTIVITIES AND ADLS

## 2024-01-17 ASSESSMENT — PAIN DESCRIPTION - DESCRIPTORS
DESCRIPTORS: ACHING
DESCRIPTORS: ACHING

## 2024-01-17 ASSESSMENT — PAIN SCALES - GENERAL
PAINLEVEL_OUTOF10: 10
PAINLEVEL_OUTOF10: 0
PAINLEVEL_OUTOF10: 8
PAINLEVEL_OUTOF10: 10

## 2024-01-17 ASSESSMENT — PAIN DESCRIPTION - LOCATION
LOCATION: ABDOMEN
LOCATION: HIP
LOCATION: HIP

## 2024-01-17 NOTE — ED PROVIDER NOTES
[facility]  EMERGENCY DEPARTMENT ENCOUNTER          ATTENDING PHYSICIAN NOTE       Date of evaluation: 1/17/2024    Chief Complaint     Fall      History of Present Illness     Janeth Pacheco is a 89 y.o. female who presents who presents to the emergency department for left hip and left shoulder pain.    Patient had a witnessed fall, described more as a slide out of her chair earlier this morning.  She did not strike her head or lose consciousness.  Since then she has been having worsening of her chronic left hip pain as well as now complaining of left shoulder pain.  She denies any chest pain or shortness of breath.  She denies any nausea or vomiting.  Upon arrival she is found to be hypoxic into the 80s but denies any shortness of breath or chest pain.  She denies any recent fever, cough, abdominal pain, nausea or vomiting.  She does report increasing lower extremity swelling over the last 2 weeks.  Patient has been compliant with her medications including her diuretic per son.    On review of EHR, patient has history of CKD, hypertension, patient chronic leg and back pain, peripheral vascular disease.  Additional history of CHF with last ejection fraction 55% and July of last year.    ASSESSMENT / PLAN  (MEDICAL DECISION MAKING)     INITIAL VITALS: BP: (!) 184/106, Temp: 98 °F (36.7 °C), Pulse: 87, Respirations: 18, SpO2: (!) 84 %      Janeth Pacheco is a 89 y.o. female who presents to the emergency department for evaluation following a fall.    Patient had a mild fall off a chair.  She slid down to the ground did not strike her head.  She reports worsening of her chronic left leg pain as well as new left shoulder pain.  Upon arrival she is found to be hypoxic with crackles bilaterally as well as 3+ pitting edema but in the lower extremities with weeping.  I am concerned for heart failure exacerbation/volume overload.  Will obtain laboratory workup, EKG chest x-ray as well as x-rays of her

## 2024-01-17 NOTE — ED NOTES
Pt resting with family at bedside, remains on bedside monitor.      Walter Lynne RN  01/17/24 [id number]

## 2024-01-17 NOTE — ED NOTES
Admit team aware patient still needs her morning dose of bp meds Per family patient was unable to take any of her am medications.      Walter Lynne RN  01/17/24 [id number]

## 2024-01-17 NOTE — ED NOTES
Pt pure wick remains in place, depends dry. Pt dressing on left leg changed due to saturation of dressing.      Walter Lynne RN  01/17/24 [id number]

## 2024-01-17 NOTE — ED NOTES
ED TO INPATIENT SBAR HANDOFF    Patient Name: Janeth Pacheco   :  1929  89 y.o.   MRN:  [id number]  Preferred Name  Janeth   ED Room #:  A08/A08-08  Family/Caregiver Present yes   Restraints no   Sitter no   Sepsis Risk Score Sepsis Risk Score: 0.99    Situation  Code Status: No Order No additional code details.    Allergies: Patient has no known allergies.  Weight: Patient Vitals for the past 96 hrs (Last 3 readings):   Weight   24 0655 56.7 kg (125 lb)     Arrived from: home  Chief Complaint:   Chief Complaint   Patient presents with    [facility]     Hospital Problem/Diagnosis:  Active Problems:    * No active hospital problems. *  Resolved Problems:    * No resolved hospital problems. *    Imaging:   CTA ABDOMINAL AORTA W BILAT RUNOFF W WO CONTRAST   Final Result      OCCLUSION OF THE LEFT SUPERFICIAL FEMORAL ARTERY WITH POPLITEAL RECONSTITUTION.      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      XR CHEST PORTABLE   Final Result      1. No evidence of acute traumatic abnormality.   2. Mild hazy density and diffuse interstitial prominence throughout both lungs,   chronic versus acute.   3. Mild thoracic scoliosis and diffuse degenerative change. Right shoulder   arthropathy.            XR HIP 2-3 VW W PELVIS LEFT   Final Result      1. No acute abnormality.   2. Mild left hip osteoarthritis.   3. Atherosclerotic disease.         XR SHOULDER LEFT (MIN 2 VIEWS)   Final Result      1. No acute abnormality.   2. Moderate left shoulder arthropathy.        Abnormal labs:   Abnormal Labs Reviewed   CBC WITH AUTO DIFFERENTIAL - Abnormal; Notable for the following components:       Result Value    Hemoglobin 11.0 (*)     Hematocrit 34.5 (*)     RDW 17.5 (*)     Lymphocytes Absolute 0.5 (*)     All other components within normal limits   BASIC METABOLIC PANEL W/ REFLEX TO MG FOR LOW K - Abnormal; Notable for the

## 2024-01-17 NOTE — ED NOTES
Pt family at bedside aware we are still waiting for ct results.      Walter Lynne, RN  01/17/24 1145

## 2024-01-17 NOTE — H&P
2. Moderate left shoulder arthropathy.    XR HIP 2-3 VW W PELVIS LEFT    Result Date: 1/17/2024  EXAM: XRAY HIP, LEFT INDICATION: pain, COMPARISON: None TECHNIQUE: *  PELVIS # OF VIEWS: 1 *  HIP # OF VIEWS: 2 FINDINGS: No fracture or dislocation. Mild left hip osteoarthritis. Sacroiliac joints demonstrate no significant sclerosis or fusion. Arterial calcification present.     1. No acute abnormality. 2. Mild left hip osteoarthritis. 3. Atherosclerotic disease.         Electronically signed by Beatrice Massey MD on 1/17/2024 at 1:43 PM    Will discuss with Dr. Khan

## 2024-01-18 ENCOUNTER — APPOINTMENT (OUTPATIENT)
Dept: GENERAL RADIOLOGY | Age: 89
DRG: 291 | End: 2024-01-18
Payer: MEDICARE

## 2024-01-18 LAB
ANION GAP SERPL CALCULATED.3IONS-SCNC: 14 MMOL/L (ref 3–16)
BASOPHILS # BLD: 0 K/UL (ref 0–0.2)
BASOPHILS NFR BLD: 0.4 %
BUN SERPL-MCNC: 70 MG/DL (ref 7–20)
CALCIUM SERPL-MCNC: 9.5 MG/DL (ref 8.3–10.6)
CHLORIDE SERPL-SCNC: 105 MMOL/L (ref 99–110)
CHOLEST SERPL-MCNC: 172 MG/DL (ref 0–199)
CO2 SERPL-SCNC: 19 MMOL/L (ref 21–32)
CREAT SERPL-MCNC: 2.8 MG/DL (ref 0.6–1.2)
DEPRECATED RDW RBC AUTO: 17.1 % (ref 12.4–15.4)
EOSINOPHIL # BLD: 0.1 K/UL (ref 0–0.6)
EOSINOPHIL NFR BLD: 2 %
GFR SERPLBLD CREATININE-BSD FMLA CKD-EPI: 15 ML/MIN/{1.73_M2}
GLUCOSE SERPL-MCNC: 104 MG/DL (ref 70–99)
HCT VFR BLD AUTO: 28 % (ref 36–48)
HDLC SERPL-MCNC: 53 MG/DL (ref 40–60)
HGB BLD-MCNC: 9.2 G/DL (ref 12–16)
LDLC SERPL CALC-MCNC: 96 MG/DL
LYMPHOCYTES # BLD: 0.6 K/UL (ref 1–5.1)
LYMPHOCYTES NFR BLD: 10.1 %
MAGNESIUM SERPL-MCNC: 2 MG/DL (ref 1.8–2.4)
MCH RBC QN AUTO: 27 PG (ref 26–34)
MCHC RBC AUTO-ENTMCNC: 32.9 G/DL (ref 31–36)
MCV RBC AUTO: 82.1 FL (ref 80–100)
MONOCYTES # BLD: 0.3 K/UL (ref 0–1.3)
MONOCYTES NFR BLD: 5.7 %
NEUTROPHILS # BLD: 4.8 K/UL (ref 1.7–7.7)
NEUTROPHILS NFR BLD: 81.8 %
PLATELET # BLD AUTO: 227 K/UL (ref 135–450)
PMV BLD AUTO: 7.8 FL (ref 5–10.5)
POTASSIUM SERPL-SCNC: 5.3 MMOL/L (ref 3.5–5.1)
RBC # BLD AUTO: 3.41 M/UL (ref 4–5.2)
SODIUM SERPL-SCNC: 138 MMOL/L (ref 136–145)
TRIGL SERPL-MCNC: 116 MG/DL (ref 0–150)
VLDLC SERPL CALC-MCNC: 23 MG/DL
WBC # BLD AUTO: 5.9 K/UL (ref 4–11)

## 2024-01-18 PROCEDURE — 6370000000 HC RX 637 (ALT 250 FOR IP): Performed by: STUDENT IN AN ORGANIZED HEALTH CARE EDUCATION/TRAINING PROGRAM

## 2024-01-18 PROCEDURE — 2580000003 HC RX 258: Performed by: STUDENT IN AN ORGANIZED HEALTH CARE EDUCATION/TRAINING PROGRAM

## 2024-01-18 PROCEDURE — 51701 INSERT BLADDER CATHETER: CPT

## 2024-01-18 PROCEDURE — 80048 BASIC METABOLIC PNL TOTAL CA: CPT

## 2024-01-18 PROCEDURE — 80061 LIPID PANEL: CPT

## 2024-01-18 PROCEDURE — 84156 ASSAY OF PROTEIN URINE: CPT

## 2024-01-18 PROCEDURE — 74018 RADEX ABDOMEN 1 VIEW: CPT

## 2024-01-18 PROCEDURE — 51798 US URINE CAPACITY MEASURE: CPT

## 2024-01-18 PROCEDURE — 83735 ASSAY OF MAGNESIUM: CPT

## 2024-01-18 PROCEDURE — 2060000000 HC ICU INTERMEDIATE R&B

## 2024-01-18 PROCEDURE — 6360000002 HC RX W HCPCS: Performed by: STUDENT IN AN ORGANIZED HEALTH CARE EDUCATION/TRAINING PROGRAM

## 2024-01-18 PROCEDURE — 82570 ASSAY OF URINE CREATININE: CPT

## 2024-01-18 PROCEDURE — 85025 COMPLETE CBC W/AUTO DIFF WBC: CPT

## 2024-01-18 PROCEDURE — 36415 COLL VENOUS BLD VENIPUNCTURE: CPT

## 2024-01-18 RX ORDER — IPRATROPIUM BROMIDE AND ALBUTEROL SULFATE 2.5; .5 MG/3ML; MG/3ML
1 SOLUTION RESPIRATORY (INHALATION)
Status: DISCONTINUED | OUTPATIENT
Start: 2024-01-18 | End: 2024-01-18

## 2024-01-18 RX ORDER — IPRATROPIUM BROMIDE AND ALBUTEROL SULFATE 2.5; .5 MG/3ML; MG/3ML
1 SOLUTION RESPIRATORY (INHALATION) EVERY 4 HOURS PRN
Status: DISCONTINUED | OUTPATIENT
Start: 2024-01-18 | End: 2024-01-21 | Stop reason: HOSPADM

## 2024-01-18 RX ORDER — FUROSEMIDE 10 MG/ML
40 INJECTION INTRAMUSCULAR; INTRAVENOUS 2 TIMES DAILY
Status: DISCONTINUED | OUTPATIENT
Start: 2024-01-18 | End: 2024-01-19

## 2024-01-18 RX ORDER — METHOCARBAMOL 750 MG/1
750 TABLET, FILM COATED ORAL 3 TIMES DAILY
Status: DISCONTINUED | OUTPATIENT
Start: 2024-01-18 | End: 2024-01-21 | Stop reason: HOSPADM

## 2024-01-18 RX ADMIN — NIFEDIPINE 30 MG: 30 TABLET, EXTENDED RELEASE ORAL at 07:58

## 2024-01-18 RX ADMIN — METHOCARBAMOL 750 MG: 750 TABLET ORAL at 21:50

## 2024-01-18 RX ADMIN — ACETAMINOPHEN 650 MG: 325 TABLET ORAL at 14:07

## 2024-01-18 RX ADMIN — DICLOFENAC SODIUM 2 G: 10 GEL TOPICAL at 21:51

## 2024-01-18 RX ADMIN — SODIUM BICARBONATE 650 MG: 650 TABLET ORAL at 07:58

## 2024-01-18 RX ADMIN — HEPARIN SODIUM 5000 UNITS: 5000 INJECTION INTRAVENOUS; SUBCUTANEOUS at 21:50

## 2024-01-18 RX ADMIN — HEPARIN SODIUM 5000 UNITS: 5000 INJECTION INTRAVENOUS; SUBCUTANEOUS at 06:27

## 2024-01-18 RX ADMIN — OXYCODONE HYDROCHLORIDE 5 MG: 5 TABLET ORAL at 23:51

## 2024-01-18 RX ADMIN — SODIUM CHLORIDE, PRESERVATIVE FREE 5 ML: 5 INJECTION INTRAVENOUS at 21:52

## 2024-01-18 RX ADMIN — SODIUM BICARBONATE 650 MG: 650 TABLET ORAL at 14:07

## 2024-01-18 RX ADMIN — LEVOTHYROXINE SODIUM 150 MCG: 150 TABLET ORAL at 06:27

## 2024-01-18 RX ADMIN — OXYCODONE HYDROCHLORIDE 5 MG: 5 TABLET ORAL at 15:49

## 2024-01-18 RX ADMIN — ACETAMINOPHEN 650 MG: 325 TABLET ORAL at 07:58

## 2024-01-18 RX ADMIN — SODIUM BICARBONATE 650 MG: 650 TABLET ORAL at 21:50

## 2024-01-18 RX ADMIN — HEPARIN SODIUM 5000 UNITS: 5000 INJECTION INTRAVENOUS; SUBCUTANEOUS at 14:07

## 2024-01-18 RX ADMIN — ASPIRIN 81 MG: 81 TABLET, CHEWABLE ORAL at 07:59

## 2024-01-18 RX ADMIN — SODIUM CHLORIDE, PRESERVATIVE FREE 10 ML: 5 INJECTION INTRAVENOUS at 07:59

## 2024-01-18 RX ADMIN — FUROSEMIDE 40 MG: 10 INJECTION, SOLUTION INTRAMUSCULAR; INTRAVENOUS at 18:30

## 2024-01-18 RX ADMIN — DICLOFENAC SODIUM 2 G: 10 GEL TOPICAL at 07:59

## 2024-01-18 RX ADMIN — FUROSEMIDE 20 MG: 10 INJECTION, SOLUTION INTRAMUSCULAR; INTRAVENOUS at 07:59

## 2024-01-18 ASSESSMENT — PAIN DESCRIPTION - DESCRIPTORS
DESCRIPTORS: ACHING
DESCRIPTORS: THROBBING
DESCRIPTORS: THROBBING
DESCRIPTORS: ACHING

## 2024-01-18 ASSESSMENT — PAIN SCALES - GENERAL
PAINLEVEL_OUTOF10: 0
PAINLEVEL_OUTOF10: 10
PAINLEVEL_OUTOF10: 8
PAINLEVEL_OUTOF10: 3
PAINLEVEL_OUTOF10: 6
PAINLEVEL_OUTOF10: 3
PAINLEVEL_OUTOF10: 8
PAINLEVEL_OUTOF10: 8
PAINLEVEL_OUTOF10: 3
PAINLEVEL_OUTOF10: 6
PAINLEVEL_OUTOF10: 0

## 2024-01-18 ASSESSMENT — PAIN DESCRIPTION - FREQUENCY
FREQUENCY: INTERMITTENT
FREQUENCY: CONTINUOUS

## 2024-01-18 ASSESSMENT — PAIN DESCRIPTION - ONSET
ONSET: ON-GOING
ONSET: GRADUAL

## 2024-01-18 ASSESSMENT — PAIN DESCRIPTION - LOCATION
LOCATION: KNEE
LOCATION: ABDOMEN;FLANK
LOCATION: FLANK
LOCATION: KNEE
LOCATION: GENERALIZED

## 2024-01-18 ASSESSMENT — PAIN DESCRIPTION - PAIN TYPE
TYPE: ACUTE PAIN
TYPE: ACUTE PAIN
TYPE: CHRONIC PAIN
TYPE: CHRONIC PAIN
TYPE: ACUTE PAIN

## 2024-01-18 ASSESSMENT — PAIN DESCRIPTION - ORIENTATION
ORIENTATION: RIGHT;LEFT
ORIENTATION: RIGHT
ORIENTATION: RIGHT;LEFT
ORIENTATION: RIGHT

## 2024-01-19 ENCOUNTER — APPOINTMENT (OUTPATIENT)
Dept: CT IMAGING | Age: 89
DRG: 291 | End: 2024-01-19
Payer: MEDICARE

## 2024-01-19 LAB
ANION GAP SERPL CALCULATED.3IONS-SCNC: 11 MMOL/L (ref 3–16)
BASOPHILS # BLD: 0 K/UL (ref 0–0.2)
BASOPHILS NFR BLD: 0.7 %
BUN SERPL-MCNC: 67 MG/DL (ref 7–20)
CALCIUM SERPL-MCNC: 8.9 MG/DL (ref 8.3–10.6)
CHLORIDE SERPL-SCNC: 105 MMOL/L (ref 99–110)
CO2 SERPL-SCNC: 19 MMOL/L (ref 21–32)
CREAT SERPL-MCNC: 3 MG/DL (ref 0.6–1.2)
CREAT UR-MCNC: 57.8 MG/DL (ref 28–259)
DEPRECATED RDW RBC AUTO: 17.2 % (ref 12.4–15.4)
EOSINOPHIL # BLD: 0.1 K/UL (ref 0–0.6)
EOSINOPHIL NFR BLD: 2 %
FERRITIN SERPL IA-MCNC: 120 NG/ML (ref 15–150)
GFR SERPLBLD CREATININE-BSD FMLA CKD-EPI: 14 ML/MIN/{1.73_M2}
GLUCOSE SERPL-MCNC: 90 MG/DL (ref 70–99)
HAPTOGLOB SERPL-MCNC: 154 MG/DL (ref 30–200)
HCT VFR BLD AUTO: 25.9 % (ref 36–48)
HGB BLD-MCNC: 8.5 G/DL (ref 12–16)
IRON SATN MFR SERPL: 13 % (ref 15–50)
IRON SERPL-MCNC: 32 UG/DL (ref 37–145)
LDH SERPL L TO P-CCNC: 282 U/L (ref 100–190)
LYMPHOCYTES # BLD: 0.7 K/UL (ref 1–5.1)
LYMPHOCYTES NFR BLD: 13.2 %
MAGNESIUM SERPL-MCNC: 1.9 MG/DL (ref 1.8–2.4)
MCH RBC QN AUTO: 27.3 PG (ref 26–34)
MCHC RBC AUTO-ENTMCNC: 32.7 G/DL (ref 31–36)
MCV RBC AUTO: 83.7 FL (ref 80–100)
MONOCYTES # BLD: 0.6 K/UL (ref 0–1.3)
MONOCYTES NFR BLD: 10.9 %
NEUTROPHILS # BLD: 4.1 K/UL (ref 1.7–7.7)
NEUTROPHILS NFR BLD: 73.2 %
PLATELET # BLD AUTO: 211 K/UL (ref 135–450)
PMV BLD AUTO: 7.7 FL (ref 5–10.5)
POTASSIUM SERPL-SCNC: 4.7 MMOL/L (ref 3.5–5.1)
PROT UR-MCNC: 265 MG/DL
PROT/CREAT UR-RTO: 4.6 MG/DL
RBC # BLD AUTO: 3.1 M/UL (ref 4–5.2)
SODIUM SERPL-SCNC: 135 MMOL/L (ref 136–145)
TIBC SERPL-MCNC: 255 UG/DL (ref 260–445)
WBC # BLD AUTO: 5.6 K/UL (ref 4–11)

## 2024-01-19 PROCEDURE — 97530 THERAPEUTIC ACTIVITIES: CPT

## 2024-01-19 PROCEDURE — 2580000003 HC RX 258: Performed by: STUDENT IN AN ORGANIZED HEALTH CARE EDUCATION/TRAINING PROGRAM

## 2024-01-19 PROCEDURE — 36415 COLL VENOUS BLD VENIPUNCTURE: CPT

## 2024-01-19 PROCEDURE — 6370000000 HC RX 637 (ALT 250 FOR IP): Performed by: STUDENT IN AN ORGANIZED HEALTH CARE EDUCATION/TRAINING PROGRAM

## 2024-01-19 PROCEDURE — 97116 GAIT TRAINING THERAPY: CPT

## 2024-01-19 PROCEDURE — 51702 INSERT TEMP BLADDER CATH: CPT

## 2024-01-19 PROCEDURE — 2060000000 HC ICU INTERMEDIATE R&B

## 2024-01-19 PROCEDURE — 83010 ASSAY OF HAPTOGLOBIN QUANT: CPT

## 2024-01-19 PROCEDURE — 83540 ASSAY OF IRON: CPT

## 2024-01-19 PROCEDURE — 70450 CT HEAD/BRAIN W/O DYE: CPT

## 2024-01-19 PROCEDURE — 51701 INSERT BLADDER CATHETER: CPT

## 2024-01-19 PROCEDURE — 97535 SELF CARE MNGMENT TRAINING: CPT

## 2024-01-19 PROCEDURE — 6360000002 HC RX W HCPCS: Performed by: STUDENT IN AN ORGANIZED HEALTH CARE EDUCATION/TRAINING PROGRAM

## 2024-01-19 PROCEDURE — 6360000002 HC RX W HCPCS: Performed by: INTERNAL MEDICINE

## 2024-01-19 PROCEDURE — 87086 URINE CULTURE/COLONY COUNT: CPT

## 2024-01-19 PROCEDURE — 2580000003 HC RX 258: Performed by: INTERNAL MEDICINE

## 2024-01-19 PROCEDURE — 80048 BASIC METABOLIC PNL TOTAL CA: CPT

## 2024-01-19 PROCEDURE — 51798 US URINE CAPACITY MEASURE: CPT

## 2024-01-19 PROCEDURE — 82728 ASSAY OF FERRITIN: CPT

## 2024-01-19 PROCEDURE — 85025 COMPLETE CBC W/AUTO DIFF WBC: CPT

## 2024-01-19 PROCEDURE — 83615 LACTATE (LD) (LDH) ENZYME: CPT

## 2024-01-19 PROCEDURE — 97166 OT EVAL MOD COMPLEX 45 MIN: CPT

## 2024-01-19 PROCEDURE — 93005 ELECTROCARDIOGRAM TRACING: CPT | Performed by: STUDENT IN AN ORGANIZED HEALTH CARE EDUCATION/TRAINING PROGRAM

## 2024-01-19 PROCEDURE — 83735 ASSAY OF MAGNESIUM: CPT

## 2024-01-19 PROCEDURE — 97162 PT EVAL MOD COMPLEX 30 MIN: CPT

## 2024-01-19 PROCEDURE — 83550 IRON BINDING TEST: CPT

## 2024-01-19 PROCEDURE — 71250 CT THORAX DX C-: CPT

## 2024-01-19 RX ORDER — FUROSEMIDE 10 MG/ML
40 INJECTION INTRAMUSCULAR; INTRAVENOUS 3 TIMES DAILY
Status: DISCONTINUED | OUTPATIENT
Start: 2024-01-19 | End: 2024-01-21 | Stop reason: HOSPADM

## 2024-01-19 RX ORDER — OXYBUTYNIN CHLORIDE 5 MG/1
5 TABLET ORAL 3 TIMES DAILY
Status: DISCONTINUED | OUTPATIENT
Start: 2024-01-19 | End: 2024-01-19

## 2024-01-19 RX ORDER — TRAZODONE HYDROCHLORIDE 50 MG/1
50 TABLET ORAL NIGHTLY
Status: DISCONTINUED | OUTPATIENT
Start: 2024-01-19 | End: 2024-01-19

## 2024-01-19 RX ORDER — TAMSULOSIN HYDROCHLORIDE 0.4 MG/1
0.4 CAPSULE ORAL DAILY
Status: DISCONTINUED | OUTPATIENT
Start: 2024-01-19 | End: 2024-01-19

## 2024-01-19 RX ORDER — TRAZODONE HYDROCHLORIDE 50 MG/1
50 TABLET ORAL NIGHTLY
Status: DISCONTINUED | OUTPATIENT
Start: 2024-01-19 | End: 2024-01-21 | Stop reason: HOSPADM

## 2024-01-19 RX ADMIN — ACETAMINOPHEN 650 MG: 325 TABLET ORAL at 11:32

## 2024-01-19 RX ADMIN — DICLOFENAC SODIUM 2 G: 10 GEL TOPICAL at 15:20

## 2024-01-19 RX ADMIN — FUROSEMIDE 40 MG: 10 INJECTION, SOLUTION INTRAMUSCULAR; INTRAVENOUS at 09:21

## 2024-01-19 RX ADMIN — DICLOFENAC SODIUM 2 G: 10 GEL TOPICAL at 19:40

## 2024-01-19 RX ADMIN — SODIUM CHLORIDE, PRESERVATIVE FREE 10 ML: 5 INJECTION INTRAVENOUS at 22:17

## 2024-01-19 RX ADMIN — HEPARIN SODIUM 5000 UNITS: 5000 INJECTION INTRAVENOUS; SUBCUTANEOUS at 15:19

## 2024-01-19 RX ADMIN — CHLOROTHIAZIDE SODIUM 500 MG: 500 INJECTION, POWDER, LYOPHILIZED, FOR SOLUTION INTRAVENOUS at 11:54

## 2024-01-19 RX ADMIN — OXYBUTYNIN CHLORIDE 5 MG: 5 TABLET ORAL at 15:29

## 2024-01-19 RX ADMIN — HEPARIN SODIUM 5000 UNITS: 5000 INJECTION INTRAVENOUS; SUBCUTANEOUS at 21:46

## 2024-01-19 RX ADMIN — METHOCARBAMOL 750 MG: 750 TABLET ORAL at 15:19

## 2024-01-19 RX ADMIN — OXYCODONE HYDROCHLORIDE 5 MG: 5 TABLET ORAL at 21:44

## 2024-01-19 RX ADMIN — FUROSEMIDE 40 MG: 10 INJECTION, SOLUTION INTRAMUSCULAR; INTRAVENOUS at 15:19

## 2024-01-19 RX ADMIN — METHOCARBAMOL 750 MG: 750 TABLET ORAL at 21:46

## 2024-01-19 RX ADMIN — LEVOTHYROXINE SODIUM 150 MCG: 150 TABLET ORAL at 06:35

## 2024-01-19 RX ADMIN — DICLOFENAC SODIUM 2 G: 10 GEL TOPICAL at 09:21

## 2024-01-19 RX ADMIN — SODIUM CHLORIDE, PRESERVATIVE FREE 10 ML: 5 INJECTION INTRAVENOUS at 09:21

## 2024-01-19 RX ADMIN — METHOCARBAMOL 750 MG: 750 TABLET ORAL at 09:21

## 2024-01-19 RX ADMIN — FUROSEMIDE 40 MG: 10 INJECTION, SOLUTION INTRAMUSCULAR; INTRAVENOUS at 21:46

## 2024-01-19 RX ADMIN — HEPARIN SODIUM 5000 UNITS: 5000 INJECTION INTRAVENOUS; SUBCUTANEOUS at 06:35

## 2024-01-19 RX ADMIN — ASPIRIN 81 MG: 81 TABLET, CHEWABLE ORAL at 09:21

## 2024-01-19 RX ADMIN — SODIUM CHLORIDE 25 ML: 9 INJECTION, SOLUTION INTRAVENOUS at 11:51

## 2024-01-19 RX ADMIN — TRAZODONE HYDROCHLORIDE 50 MG: 50 TABLET ORAL at 21:45

## 2024-01-19 RX ADMIN — NIFEDIPINE 30 MG: 30 TABLET, EXTENDED RELEASE ORAL at 09:21

## 2024-01-19 ASSESSMENT — PAIN SCALES - GENERAL
PAINLEVEL_OUTOF10: 0
PAINLEVEL_OUTOF10: 9
PAINLEVEL_OUTOF10: 5
PAINLEVEL_OUTOF10: 9
PAINLEVEL_OUTOF10: 0
PAINLEVEL_OUTOF10: 7
PAINLEVEL_OUTOF10: 5
PAINLEVEL_OUTOF10: 7
PAINLEVEL_OUTOF10: 5
PAINLEVEL_OUTOF10: 6
PAINLEVEL_OUTOF10: 0
PAINLEVEL_OUTOF10: 0
PAINLEVEL_OUTOF10: 6
PAINLEVEL_OUTOF10: 8

## 2024-01-19 ASSESSMENT — PAIN DESCRIPTION - PAIN TYPE
TYPE: CHRONIC PAIN
TYPE: CHRONIC PAIN
TYPE: ACUTE PAIN
TYPE: CHRONIC PAIN

## 2024-01-19 ASSESSMENT — PAIN DESCRIPTION - ORIENTATION
ORIENTATION: RIGHT;LEFT
ORIENTATION: RIGHT;LEFT
ORIENTATION: MID;RIGHT;LEFT
ORIENTATION: RIGHT;LEFT
ORIENTATION: MID;RIGHT;LEFT
ORIENTATION: LEFT

## 2024-01-19 ASSESSMENT — PAIN DESCRIPTION - FREQUENCY
FREQUENCY: CONTINUOUS

## 2024-01-19 ASSESSMENT — PAIN DESCRIPTION - DESCRIPTORS
DESCRIPTORS: ACHING

## 2024-01-19 ASSESSMENT — PAIN - FUNCTIONAL ASSESSMENT
PAIN_FUNCTIONAL_ASSESSMENT: PREVENTS OR INTERFERES SOME ACTIVE ACTIVITIES AND ADLS
PAIN_FUNCTIONAL_ASSESSMENT: ACTIVITIES ARE NOT PREVENTED
PAIN_FUNCTIONAL_ASSESSMENT: PREVENTS OR INTERFERES WITH MANY ACTIVE NOT PASSIVE ACTIVITIES

## 2024-01-19 ASSESSMENT — PAIN DESCRIPTION - LOCATION
LOCATION: CHEST;BACK;HEAD
LOCATION: BACK
LOCATION: BACK
LOCATION: KNEE
LOCATION: CHEST;BACK;HEAD
LOCATION: EYE;HEAD

## 2024-01-19 ASSESSMENT — PAIN DESCRIPTION - ONSET
ONSET: GRADUAL
ONSET: ON-GOING
ONSET: SUDDEN
ONSET: GRADUAL
ONSET: SUDDEN
ONSET: GRADUAL

## 2024-01-19 ASSESSMENT — PAIN DESCRIPTION - DIRECTION
RADIATING_TOWARDS: LEFT ARM

## 2024-01-19 NOTE — DISCHARGE INSTRUCTIONS
Extra Heart Failure sites:     https://[employer].com/publication/?s=653579   --- this is American Heart Association interactive Healthier Living with Heart Failure guidebook.  Please click hyperlink or copy / paste link into search bar. Use your mouse to scroll through the pages.  Lots of information about weight monitoring, diet tips, activity, meds, etc    HF [place] mariella  -- this is a free smart phone mariella available for iPhone and Android download.  Use your phone to track sodium / fluid intake, zone tool symptom tracking, weights, medications, etc. Click on this hyperlink  HF [place] Mariella   for QR code for easy download.       DASH (Dietary Approach to Stop Hypertension) diet --  https://www.nhlbi.nih.gov/education/dash-eating-plan -- this diet is a flexible eating plan that promotes heart healthy eating style.  Click on hyperlink or copy / paste link into search bar.  Lots of low sodium recipes and tips.    https://www.[employer]/recipes  -- more free recipes

## 2024-01-19 NOTE — DISCHARGE INSTR - COC
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    Acute decompensated heart failure (HCC) I50.9       Isolation/Infection:   Isolation            Contact          Patient Infection Status       Infection Onset Added Last Indicated Last Indicated By Review Planned Expiration Resolved Resolved By    MRSA 04/01/20 04/03/20 04/01/20 Culture, Wound                           Nurse Assessment:  Last Vital Signs: /75   Pulse 79   Temp 97.9 °F (36.6 °C) (Axillary)   Resp 20   Ht 1.626 m (5' 4\")   Wt 77 kg (169 lb 12.1 oz)   SpO2 90%   BMI 29.14 kg/m²     Last documented pain score (0-10 scale): Pain Level: 6  Last Weight:   Wt Readings from Last 1 Encounters:   01/19/24 77 kg (169 lb 12.1 oz)     Mental Status:  disoriented and alert    IV Access:  - None    Nursing Mobility/ADLs:  Walking   Dependent  Transfer  Dependent  Bathing  Dependent  Dressing  Dependent  Toileting  Dependent  Feeding  Assisted  Med Admin  Assisted  Med Delivery   none    Wound Care Documentation and Therapy:  Wound 01/17/24 Knee Left;Anterior (Active)   Wound Etiology Skin Tear 01/19/24 1132   Dressing Status Clean;Dry;Intact 01/18/24 1539   Wound Cleansed Not Cleansed 01/19/24 1132   Dressing/Treatment Open to air 01/19/24 1132   Wound Assessment Pink/red 01/19/24 1132   Drainage Amount None (dry) 01/19/24 1132   Drainage Description Serosanguinous 01/17/24 1600   Odor None 01/19/24 1132   Aury-wound Assessment Intact 01/19/24 1132   Number of days: 1        Elimination:  Continence:   Bowel: No  Bladder: No  Urinary Catheter: Insertion Date: 1/19/2024    Colostomy/Ileostomy/Ileal Conduit: No       Date of Last BM:     Intake/Output Summary (Last 24 hours) at 1/19/2024 1507  Last data filed at 1/19/2024 1333  Gross per 24 hour   Intake 1090 ml   Output 2050 ml   Net -960 ml     I/O last 3 completed shifts:  In: 1030 [P.O.:1020; I.V.:10]  Out: 2125 [Urine:2125]    Safety Concerns:     History of Falls (last 30 days) and At Risk for

## 2024-01-20 LAB
ANION GAP SERPL CALCULATED.3IONS-SCNC: 12 MMOL/L (ref 3–16)
BACTERIA UR CULT: NORMAL
BASOPHILS # BLD: 0 K/UL (ref 0–0.2)
BASOPHILS NFR BLD: 0.7 %
BUN SERPL-MCNC: 70 MG/DL (ref 7–20)
CALCIUM SERPL-MCNC: 8.7 MG/DL (ref 8.3–10.6)
CHLORIDE SERPL-SCNC: 104 MMOL/L (ref 99–110)
CO2 SERPL-SCNC: 20 MMOL/L (ref 21–32)
CREAT SERPL-MCNC: 3.3 MG/DL (ref 0.6–1.2)
DEPRECATED RDW RBC AUTO: 17.7 % (ref 12.4–15.4)
EKG ATRIAL RATE: 75 BPM
EKG DIAGNOSIS: NORMAL
EKG P AXIS: 45 DEGREES
EKG P-R INTERVAL: 206 MS
EKG Q-T INTERVAL: 394 MS
EKG QRS DURATION: 100 MS
EKG QTC CALCULATION (BAZETT): 439 MS
EKG R AXIS: 39 DEGREES
EKG T AXIS: 45 DEGREES
EKG VENTRICULAR RATE: 75 BPM
EOSINOPHIL # BLD: 0.2 K/UL (ref 0–0.6)
EOSINOPHIL NFR BLD: 4.7 %
GFR SERPLBLD CREATININE-BSD FMLA CKD-EPI: 12 ML/MIN/{1.73_M2}
GLUCOSE SERPL-MCNC: 103 MG/DL (ref 70–99)
HCT VFR BLD AUTO: 24.1 % (ref 36–48)
HGB BLD-MCNC: 7.8 G/DL (ref 12–16)
LYMPHOCYTES # BLD: 0.7 K/UL (ref 1–5.1)
LYMPHOCYTES NFR BLD: 15.7 %
MAGNESIUM SERPL-MCNC: 2 MG/DL (ref 1.8–2.4)
MCH RBC QN AUTO: 27.5 PG (ref 26–34)
MCHC RBC AUTO-ENTMCNC: 32.4 G/DL (ref 31–36)
MCV RBC AUTO: 84.9 FL (ref 80–100)
MONOCYTES # BLD: 0.5 K/UL (ref 0–1.3)
MONOCYTES NFR BLD: 12.1 %
NEUTROPHILS # BLD: 3 K/UL (ref 1.7–7.7)
NEUTROPHILS NFR BLD: 66.8 %
PLATELET # BLD AUTO: 188 K/UL (ref 135–450)
PMV BLD AUTO: 7.3 FL (ref 5–10.5)
POTASSIUM SERPL-SCNC: 4.5 MMOL/L (ref 3.5–5.1)
RBC # BLD AUTO: 2.84 M/UL (ref 4–5.2)
SODIUM SERPL-SCNC: 136 MMOL/L (ref 136–145)
WBC # BLD AUTO: 4.5 K/UL (ref 4–11)

## 2024-01-20 PROCEDURE — 80048 BASIC METABOLIC PNL TOTAL CA: CPT

## 2024-01-20 PROCEDURE — 93010 ELECTROCARDIOGRAM REPORT: CPT | Performed by: INTERNAL MEDICINE

## 2024-01-20 PROCEDURE — 6370000000 HC RX 637 (ALT 250 FOR IP): Performed by: STUDENT IN AN ORGANIZED HEALTH CARE EDUCATION/TRAINING PROGRAM

## 2024-01-20 PROCEDURE — 85025 COMPLETE CBC W/AUTO DIFF WBC: CPT

## 2024-01-20 PROCEDURE — 6360000002 HC RX W HCPCS: Performed by: INTERNAL MEDICINE

## 2024-01-20 PROCEDURE — 6360000002 HC RX W HCPCS: Performed by: STUDENT IN AN ORGANIZED HEALTH CARE EDUCATION/TRAINING PROGRAM

## 2024-01-20 PROCEDURE — 2580000003 HC RX 258: Performed by: STUDENT IN AN ORGANIZED HEALTH CARE EDUCATION/TRAINING PROGRAM

## 2024-01-20 PROCEDURE — 36415 COLL VENOUS BLD VENIPUNCTURE: CPT

## 2024-01-20 PROCEDURE — 2060000000 HC ICU INTERMEDIATE R&B

## 2024-01-20 PROCEDURE — 83735 ASSAY OF MAGNESIUM: CPT

## 2024-01-20 RX ORDER — ACETAMINOPHEN 500 MG
1000 TABLET ORAL EVERY 8 HOURS SCHEDULED
Status: DISCONTINUED | OUTPATIENT
Start: 2024-01-20 | End: 2024-01-21 | Stop reason: HOSPADM

## 2024-01-20 RX ORDER — DIAZEPAM 2 MG/1
2 TABLET ORAL EVERY 6 HOURS PRN
Status: DISCONTINUED | OUTPATIENT
Start: 2024-01-20 | End: 2024-01-21 | Stop reason: HOSPADM

## 2024-01-20 RX ADMIN — ACETAMINOPHEN 1000 MG: 500 TABLET ORAL at 12:27

## 2024-01-20 RX ADMIN — LEVOTHYROXINE SODIUM 150 MCG: 150 TABLET ORAL at 06:28

## 2024-01-20 RX ADMIN — FUROSEMIDE 40 MG: 10 INJECTION, SOLUTION INTRAMUSCULAR; INTRAVENOUS at 09:04

## 2024-01-20 RX ADMIN — HEPARIN SODIUM 5000 UNITS: 5000 INJECTION INTRAVENOUS; SUBCUTANEOUS at 14:05

## 2024-01-20 RX ADMIN — HEPARIN SODIUM 5000 UNITS: 5000 INJECTION INTRAVENOUS; SUBCUTANEOUS at 20:56

## 2024-01-20 RX ADMIN — HEPARIN SODIUM 5000 UNITS: 5000 INJECTION INTRAVENOUS; SUBCUTANEOUS at 06:28

## 2024-01-20 RX ADMIN — FUROSEMIDE 40 MG: 10 INJECTION, SOLUTION INTRAMUSCULAR; INTRAVENOUS at 20:57

## 2024-01-20 RX ADMIN — ACETAMINOPHEN 1000 MG: 500 TABLET ORAL at 20:55

## 2024-01-20 RX ADMIN — METHOCARBAMOL 750 MG: 750 TABLET ORAL at 20:56

## 2024-01-20 RX ADMIN — DICLOFENAC SODIUM 2 G: 10 GEL TOPICAL at 14:33

## 2024-01-20 RX ADMIN — DICLOFENAC SODIUM 2 G: 10 GEL TOPICAL at 20:57

## 2024-01-20 RX ADMIN — FUROSEMIDE 40 MG: 10 INJECTION, SOLUTION INTRAMUSCULAR; INTRAVENOUS at 14:05

## 2024-01-20 RX ADMIN — DIAZEPAM 2 MG: 2 TABLET ORAL at 18:06

## 2024-01-20 RX ADMIN — SODIUM CHLORIDE, PRESERVATIVE FREE 10 ML: 5 INJECTION INTRAVENOUS at 20:57

## 2024-01-20 RX ADMIN — SODIUM CHLORIDE, PRESERVATIVE FREE 10 ML: 5 INJECTION INTRAVENOUS at 09:05

## 2024-01-20 RX ADMIN — TRAZODONE HYDROCHLORIDE 50 MG: 50 TABLET ORAL at 20:56

## 2024-01-20 RX ADMIN — ASPIRIN 81 MG: 81 TABLET, CHEWABLE ORAL at 09:05

## 2024-01-20 RX ADMIN — SODIUM CHLORIDE 25 ML: 9 INJECTION, SOLUTION INTRAVENOUS at 12:26

## 2024-01-20 RX ADMIN — DICLOFENAC SODIUM 2 G: 10 GEL TOPICAL at 09:11

## 2024-01-20 RX ADMIN — IRON SUCROSE 200 MG: 20 INJECTION, SOLUTION INTRAVENOUS at 12:26

## 2024-01-20 RX ADMIN — NIFEDIPINE 30 MG: 30 TABLET, EXTENDED RELEASE ORAL at 09:04

## 2024-01-20 RX ADMIN — METHOCARBAMOL 750 MG: 750 TABLET ORAL at 09:04

## 2024-01-20 ASSESSMENT — PAIN SCALES - GENERAL
PAINLEVEL_OUTOF10: 5
PAINLEVEL_OUTOF10: 5
PAINLEVEL_OUTOF10: 0
PAINLEVEL_OUTOF10: 5

## 2024-01-20 ASSESSMENT — PAIN - FUNCTIONAL ASSESSMENT
PAIN_FUNCTIONAL_ASSESSMENT: PREVENTS OR INTERFERES SOME ACTIVE ACTIVITIES AND ADLS
PAIN_FUNCTIONAL_ASSESSMENT: PREVENTS OR INTERFERES SOME ACTIVE ACTIVITIES AND ADLS

## 2024-01-20 ASSESSMENT — PAIN DESCRIPTION - ONSET
ONSET: ON-GOING
ONSET: ON-GOING

## 2024-01-20 ASSESSMENT — PAIN DESCRIPTION - LOCATION
LOCATION: BACK
LOCATION: BACK

## 2024-01-20 ASSESSMENT — PAIN DESCRIPTION - PAIN TYPE
TYPE: CHRONIC PAIN
TYPE: CHRONIC PAIN

## 2024-01-20 ASSESSMENT — PAIN DESCRIPTION - DESCRIPTORS
DESCRIPTORS: ACHING
DESCRIPTORS: ACHING

## 2024-01-20 ASSESSMENT — PAIN DESCRIPTION - ORIENTATION
ORIENTATION: MID;LOWER
ORIENTATION: MID;LOWER

## 2024-01-20 ASSESSMENT — PAIN DESCRIPTION - FREQUENCY
FREQUENCY: INTERMITTENT
FREQUENCY: INTERMITTENT

## 2024-01-21 VITALS
HEIGHT: 64 IN | HEART RATE: 77 BPM | BODY MASS INDEX: 28 KG/M2 | RESPIRATION RATE: 17 BRPM | TEMPERATURE: 98.4 F | WEIGHT: 164.02 LBS | OXYGEN SATURATION: 90 % | DIASTOLIC BLOOD PRESSURE: 71 MMHG | SYSTOLIC BLOOD PRESSURE: 127 MMHG

## 2024-01-21 LAB
ANION GAP SERPL CALCULATED.3IONS-SCNC: 13 MMOL/L (ref 3–16)
BUN SERPL-MCNC: 70 MG/DL (ref 7–20)
CALCIUM SERPL-MCNC: 8.5 MG/DL (ref 8.3–10.6)
CHLORIDE SERPL-SCNC: 103 MMOL/L (ref 99–110)
CO2 SERPL-SCNC: 21 MMOL/L (ref 21–32)
CREAT SERPL-MCNC: 3.3 MG/DL (ref 0.6–1.2)
GFR SERPLBLD CREATININE-BSD FMLA CKD-EPI: 12 ML/MIN/{1.73_M2}
GLUCOSE SERPL-MCNC: 85 MG/DL (ref 70–99)
MAGNESIUM SERPL-MCNC: 2 MG/DL (ref 1.8–2.4)
POTASSIUM SERPL-SCNC: 4.3 MMOL/L (ref 3.5–5.1)
SODIUM SERPL-SCNC: 137 MMOL/L (ref 136–145)

## 2024-01-21 PROCEDURE — 36415 COLL VENOUS BLD VENIPUNCTURE: CPT

## 2024-01-21 PROCEDURE — 80048 BASIC METABOLIC PNL TOTAL CA: CPT

## 2024-01-21 PROCEDURE — 83735 ASSAY OF MAGNESIUM: CPT

## 2024-01-21 PROCEDURE — 6370000000 HC RX 637 (ALT 250 FOR IP): Performed by: STUDENT IN AN ORGANIZED HEALTH CARE EDUCATION/TRAINING PROGRAM

## 2024-01-21 PROCEDURE — 6360000002 HC RX W HCPCS: Performed by: INTERNAL MEDICINE

## 2024-01-21 PROCEDURE — 6360000002 HC RX W HCPCS: Performed by: STUDENT IN AN ORGANIZED HEALTH CARE EDUCATION/TRAINING PROGRAM

## 2024-01-21 PROCEDURE — 2580000003 HC RX 258: Performed by: STUDENT IN AN ORGANIZED HEALTH CARE EDUCATION/TRAINING PROGRAM

## 2024-01-21 RX ORDER — LORAZEPAM 2 MG/ML
1 CONCENTRATE ORAL EVERY 8 HOURS PRN
Status: CANCELLED | OUTPATIENT
Start: 2024-01-21

## 2024-01-21 RX ORDER — MORPHINE SULFATE 20 MG/ML
SOLUTION ORAL EVERY 4 HOURS PRN
Status: CANCELLED | OUTPATIENT
Start: 2024-01-21

## 2024-01-21 RX ADMIN — HEPARIN SODIUM 5000 UNITS: 5000 INJECTION INTRAVENOUS; SUBCUTANEOUS at 05:30

## 2024-01-21 RX ADMIN — DIAZEPAM 2 MG: 2 TABLET ORAL at 00:16

## 2024-01-21 RX ADMIN — ASPIRIN 81 MG: 81 TABLET, CHEWABLE ORAL at 09:24

## 2024-01-21 RX ADMIN — NIFEDIPINE 30 MG: 30 TABLET, EXTENDED RELEASE ORAL at 09:24

## 2024-01-21 RX ADMIN — DICLOFENAC SODIUM 2 G: 10 GEL TOPICAL at 07:52

## 2024-01-21 RX ADMIN — LEVOTHYROXINE SODIUM 150 MCG: 150 TABLET ORAL at 05:30

## 2024-01-21 RX ADMIN — SODIUM CHLORIDE, PRESERVATIVE FREE 10 ML: 5 INJECTION INTRAVENOUS at 07:52

## 2024-01-21 RX ADMIN — FUROSEMIDE 40 MG: 10 INJECTION, SOLUTION INTRAMUSCULAR; INTRAVENOUS at 07:52

## 2024-01-21 RX ADMIN — ACETAMINOPHEN 1000 MG: 500 TABLET ORAL at 05:30

## 2024-01-21 RX ADMIN — METHOCARBAMOL 750 MG: 750 TABLET ORAL at 09:24

## 2024-01-21 ASSESSMENT — PAIN SCALES - GENERAL
PAINLEVEL_OUTOF10: 0
PAINLEVEL_OUTOF10: 0

## 2024-01-21 NOTE — DISCHARGE SUMMARY
iliac:  Patent without stenosis. Left Common iliac:  Calcified atherosclerotic plaque without flow-limiting stenosis. Internal iliac:  Patent without stenosis. External iliac:  Patent without stenosis. Lower Extremities: Right Common femoral:  Patent without stenosis. Superficial femoral: Occlusion of the native right superficial femoral artery with femoral to popliteal bypass visualized, patent. Profunda femoris:  Patent without stenosis. Popliteal arteries: Secondary to marked streak artifact the distal popliteal artery is not visualized. There is moderate to severe  stenosis of the mid popliteal artery. Anterior tibial: Patent to the level of the foot Tibioperoneal trunk: Patent without stenosis. Posterior tibial: Patent to the level of the ankle. Peroneal: Patent to the foot. Left Common femoral:  Patent without stenosis. Superficial femoral: Occlusion of the superficial femoral artery. Profunda femoris:  Patent without stenosis. Popliteal arteries: Reconstitution of the popliteal artery with multifocal severe stenosis. Anterior tibial: Patent to the foot. Tibioperoneal trunk: Patent without stenosis. Posterior tibial: Patent to the foot. Peroneal:  Patent to the foot. Non-Vascular Findings:  Liver: Normal. Gallbladder: Cholelithiasis is noted. Spleen: Normal. Pancreas: Normal. Adrenals: Normal. Lymphadenopathy: None. Bowel: Normal caliber. Peritoneum: No free fluid.     OCCLUSION OF THE LEFT SUPERFICIAL FEMORAL ARTERY WITH POPLITEAL RECONSTITUTION. 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    XR CHEST PORTABLE    Result Date: 1/17/2024  EXAM: PORTABLE AP CHEST X-RAY, 7:44 a.m. INDICATION: pain, fall, COMPARISON: none FINDINGS: HEART / MEDIASTINUM: Heart is mildly enlarged. LUNGS/PLEURA: Mild hazy density and diffuse interstitial prominence is present throughout both lungs. No pneumothorax. BONES / SOFT TISSUES: Mild thoracic

## 2024-01-21 NOTE — PROGRESS NOTES
Ph: [phone], Fax: [phone]                                     [facility]       Reason for admission:                 Dyspnea     Brief Summary :     Janeth Pacheco is being seen by nephrology for CKD.    Interval History and plan:     BP is elevated    Urine is 1625 ml   Worsening creatinine 2.5 > 3.0   Weight 167 > 169 pounds     Check iron studies for anemia   May need procrit   Has 4.6 grams of proteinuria    She has stage 4 CKD and is not interested in dialysis   Increase lasix TID  Stop sodium Bicarbonate        Assessment :     -SILAS on CKDIV / CRS + contrast load  -Hyperkalemia  -Volume overload due to acute decompensated heart failure  -agree with increasing lasix t  -can add diuril increase lasix to TID if needed, adjust diuretics accordingly   -strict I/Os, daily weights  -trend Cr  -daily renal panel   -check protein/creatinine ratio  -poor dialysis candidate and patient/family want to avoid, manage with aggressive diuresis, increase lasix if UOP remains poor   -low salt diet/ fluid restriction       Please call with questions at-  24 hrs Answering service [phone]   7 am-5 pm at Perfect serve, or cell phone  Dr Berto Mireles MD      HPI:     Janeth Pacheco is a 89 y.o. female presented to   the hospital on 1/17/2024 with HFpEF, HTN, PAD who is admitted with shortness of breath.     Janeth Pacheco is a 89 y.o. female with pmh of hypothyroidism, HFpEF, HTN, PAD s/p R fem pop bypass in 2015, knee osteoarthritis  who presents with fall. Patient's son reports she slid off her chair this morning. She did not hit her head. She was having worsening hip, shoulder and leg pain prompting family to bring her in for evaluation. She could not get back up to the chair. On arrival, patient was hypoxic in the 80s. Son reports she has had more SOB and TELLO. She has also had increasing LE swelling. Denies any CP, orthopnea, PND, N/V or abdominal pain. She is 
                          Ph: [phone], Fax: [phone]                                     [facility]       Reason for admission:                 Dyspnea     Brief Summary :     Janeth Pacheco is being seen by nephrology for CKD.    Interval History and plan:     Seen in room pleasant and oriented  On oxygen  Has significant pedal edema but getting better  Creatinine continues to go up  Has Fernando  Made 2.2 L of urine yesterday  Still appears to have significant fluid overload and unfortunately creatinine continues to go up  She has declining muscle mass for some time - not sure if her creatinine creatinine GFR is accurate  GFR likely overestimated  Discussed with multiple family members including son daughter-in-law and granddaughter at bedside and explained prognosis  Family wishes not to go for dialysis and meeting hospice   planning to meet with him today  Meantime we will continue on Lasix for comfort care  Also got a dose of Diuril yesterday  May be needed if urine output drops again     Assessment :     -SILAS on CKDIV / CRS + contrast load  -Hyperkalemia  -Volume overload due to acute decompensated heart failure  -agree with increasing lasix t  -can add diuril increase lasix to TID if needed, adjust diuretics accordingly   -strict I/Os, daily weights  -trend Cr  -daily renal panel   -check protein/creatinine ratio  -poor dialysis candidate and patient/family want to avoid, manage with aggressive diuresis, increase lasix if UOP remains poor   -low salt diet/ fluid restriction       Please call with questions at-  24 hrs Answering service [phone]   7 am-5 pm at Perfect serve, or cell phone  Dr Josue Nieves MD      HPI:     Janeth Pacheco is a 89 y.o. female presented to   the hospital on 1/17/2024 with HFpEF, HTN, PAD who is admitted with shortness of breath.     Janeth Pacheco is a 89 y.o. female with pmh of hypothyroidism, HFpEF, HTN, PAD s/p R fem pop bypass in 2015, knee 
    [facility] [place] Progress Note    Assessment and Plan   Janeth Pacheco is a 89 y.o. female with history of CKD 4, hypertension, PVD, and HFpEF presenting for subacute on chronic left hip and shoulder pain. In the ED she was notably hypoxic to 84% but denied any shortness of breath.    Problem list:   1. Acute on chronic HFpEF  2. Acute hypoxic respiratory failure   3. SILAS on CKD4  4. PAD  5. Mechanical fall  6. Demand ischemia  7. Acute on chronic iron deficiency anemia  8. Urinary retention      Assessment:  Documented UOP 2.2L with net negative 1L over 24h. Weight trend: 77 kg -> 75.8 kg on bed scale. BLE edema improving but still requiring 3-4L NC. Chest CT showed small left pleural effusion, bilateral dependent opacities with diffuse areas of groundglass, likely pulmonary edema. Cardiomegaly with marked enlargement of pulmonary artery, may have some underlying component of pulmonary hypertension. Cr continues to worsen 2.5 -> 2.8 -> 3.0 -> 3.5 today. I discussed plan of care with son Maico at bedside. He and his 2 other siblings, Guerrero and Janeth, are interested in hospice care and focusing on comfort.      Plan:   - Continue IV lasix 40 mg TID, monitor lytes and renal fx  - Daily standing weights if able  - Goal daily net negative 2-3L   - Appreciate nephrology recommendations  - Iron studies c/w DORIE, will give IV iron, defer procrit to nephrology  - Fecal occult ordered, no melena or hematochezia reported  - Discussed disposition with case management. Hospice consult placed, plan to [facility] with [facility] at 4:30.   - Goldberg placed yesterday for urinary retention    Diet: ADULT DIET; Regular; Low Sodium (2 gm); 2000 ml  DVT ppx: subq heparin  LDA: goldberg, PIV  Disposition: TBD, PT/OT recommended SNF but pt and family prefer to return to her indepdenent living facility.   Surrogate decision maker/POA: Son Guerrero. Daughter Janeth and other son Maico are also involved in her care.   Code status: 
4 Eyes Skin Assessment     NAME:  Janeth Pacheco  YOB: 1929  MEDICAL RECORD NUMBER:  [id number]    The patient is being assessed for  Admission    I agree that at least one RN has performed a thorough Head to Toe Skin Assessment on the patient. ALL assessment sites listed below have been assessed.      Areas assessed by both nurses:    Head, Face, Ears, Shoulders, Back, Chest, Arms, Elbows, Hands, Sacrum. Buttock, Coccyx, Ischium, and Legs. Feet and Heels        Does the Patient have a Wound? Yes wound(s) were present on assessment. LDA wound assessment was Initiated and completed by RN       Michael Prevention initiated by RN: Yes  Wound Care Orders initiated by RN: Yes    Pressure Injury (Stage 3,4, Unstageable, DTI, NWPT, and Complex wounds) if present, place Wound referral order by RN under : Yes    New Ostomies, if present place, Ostomy referral order under : No     Nurse 1 eSignature: Electronically signed by Akila Chakraborty RN on 1/17/24 at 3:36 PM EST    **SHARE this note so that the co-signing nurse can place an eSignature**    Nurse 2 eSignature: Electronically signed by Funmi Hutchinson RN on 1/17/24 at 4:16 PM EST    
APRN notified by RN of patient c/o headache with left eye pain associated with new confusion. Per RN report, patient able to follow commands but confused about location/situation. Neuro check WNL, no redness to eye  -tylenol for headache  -given constellation of symptoms, will get non-con CT head  -cont supportive care    MELANIA Garcia - NP   
Bladder gnyc=155 ml. Pt has not voided since straight cath done at 1300 today. Dr. Khan notified and order received for goldberg insertion. 16 fr goldberg inserted using sterile technique. 500 ml of clear/yellow urine drained. Urine sent to lab per orders. Pt tolerated procedure fairly well. Electronically signed by Tali Dwyer RN on 1/19/2024 at 7:05 PM    
Bladder scan showed 550 ml. Dr. Khan notified and order for straight cath obtained. Straight cath completed using sterile technique. 600 ml of clear/yellow urine removed. Pt tolerated procedure well. Electronically signed by Tali Dwyer RN on 1/18/2024 at 7:02 PM    
Bladder vpbz=814 ml. Dr. Khan notified. PRN order for straight cath in place. Straight cath completed using sterile technique. 600 ml of clear/yellow urine removed. Pt tolerated procedure fairly well. Electronically signed by Tali Dwyer RN on 1/19/2024 at 1:34 PM    
Clinical Pharmacy Consult Note  Medication History     Admit Date: 1/17/2024    Pharmacy consulted to verify home medication list by Dr. Beatrice Massey.    List of of current medications patient is taking is complete. Home Medication list in EPIC updated to reflect changes noted below.    Source of information: I spoke to the patient, her son, and viewed her dispense report.    Patient's home pharmacy: [facility] - [address] - P [phone] -  [phone]      Changes made to medication list:   Medications removed: (include reason, ex: therapy completed, patient no longer taking, etc.)  Tramadol- Not taking  Aspirin- Not taking  Amitriptyline- Not taking  Zestoretic- Not taking  Docusate- Not taking  Multivitamin- Not taking  Vitamin C- Not taking   Medications added:   Furosemide 20 mg tablets  Nifedipine 30 mg ER tablets  Medication doses adjusted:   None  Other notes:   None    Current Outpatient Medications   Medication Instructions    furosemide (LASIX) 20 mg, Oral, ONCE DAILY    levothyroxine (SYNTHROID) 150 mcg, Oral, DAILY    NIFEdipine (ADALAT CC) 30 mg, Oral, DAILY    traZODone (DESYREL) 100 mg, Oral, NIGHTLY PRN       Please call with any questions.    Spencer Brumfield, Pharmacy Intern      
Full consult to follow  
Notified MD, patient is having pain 9/10 in her right hip. She says it comes and goes. Tylenol is only PRN order at this time. Family and patient state that valium and oxycodone were given in ED and worked for pain management. MD places new orders.    RN also notifies MD that patient's BP is elevated 182/103, last checked was 176/92, other vitals within defined limits. MD orders new medication for patient.  
Notifies MD of patient's headache, rating it an 8 out of 10. Oxycodone has already been given for pain. BP is 193/97, hydralazine tablet given by RN per orders. MD orders new medication for headaches.  
Occupational Therapy  Facility/Department: [facility] PCU  Occupational Therapy Initial Assessment and Treatment    Name: Janeth Pacheco  : 1929  MRN: [id number]  Date of Service: 2024    Discharge Recommendations:  Subacute/Skilled Nursing Facility (if home, recommend 24hr assist and home OT)  OT Equipment Recommendations  Equipment Needed: No  Other: pt has the recommended DME       Patient Diagnosis(es): The primary encounter diagnosis was Hypervolemia, unspecified hypervolemia type. Diagnoses of Hypoxia and Left leg pain were also pertinent to this visit.  Past Medical History:  has a past medical history of Basal cell carcinoma, Hand swelling, Hyperlipidemia, Hypertension, Hypothyroidism, and Leg swelling.  Past Surgical History:  has a past surgical history that includes joint replacement (); joint replacement (); Mohs surgery (2019); and Mohs surgery (Right, 12/10/2019).    Treatment Diagnosis: Impaired ADLs, mobility, activity tolerance      Assessment   Performance deficits / Impairments: Decreased functional mobility ;Decreased ADL status;Decreased balance;Decreased strength;Decreased endurance  Assessment: Pt from facility with nearly 24hr assist from family and caregivers. Pt requires assist for most ADLs at baseline, is typically ambulatory with RW. Pt demo functional decline due to gross weakness and deconditioning. Pt lethargic this date and demo impaired cognition. Pt requires assist of 1-2 for transfers and mobility related ADLs. Recommend SNF upon d/c. Son states preference for pt d/c home with increased assist from family and facility staff and home therapy.  Treatment Diagnosis: Impaired ADLs, mobility, activity tolerance  Prognosis: Fair  Decision Making: Medium Complexity  REQUIRES OT FOLLOW-UP: Yes  Activity Tolerance  Activity Tolerance: Patient limited by fatigue        Plan   Occupational Therapy Plan  Times Per Week: 2-5  Current Treatment Recommendations: 
Patient bladder scanned per order with 122ml retained.         Grace White RN    
Patient on PCU. Tele placed, call light within reach, oriented to room. RN takes vitals and performs head to toe assessment for admission.  
Patient requires IV contrasted study for emergent evaluation of potential limb threatening pathology.  
Physical Therapy/Occupational Therapy   Refusal     Orders received and chart reviewed. Attempted to evaluate pt this PM however pt just assisted back to bed by RN and given pain meds. Pt politely refusing PT/OT evals at this time. PT/OT will attempt to evaluate pt on 1/19. RN aware.      Ishaan Serrano, PT, DPT   Vanessa Mcintyre, OT [id number]          
Pt c/o chest pain and back of her head ache after CT. EKG stat order. Notify NP. Waiting for order. Pt give pain meds as schedule and PRN.     2230: NP on bedside. Pt denies any pain but appears more drowsy.   
Pt c/o eye pain 5/10, eye pain is new to her, describe the pain as it is at back of her eyes with some headache. Pt also c/o left arm pain. Denies chest pain. Pt is alert and oriented to self and time, disoriented with place and situation. NP notify. Waiting for order.   
Pt d/c from room with EMS transport.  All belongings sent with patient.  Tele removed from patient.  Report given to hospice RN.    
Vascular Surgery   Daily Progress Note  Patient: Janeth Pacheco      CC: PAD      SUBJECTIVE:   Patient rested well overnight. Continues to deny rest pain or claudication symptoms.     ROS:   A 14 point review of systems was conducted, significant findings as noted above. All other systems negative.    OBJECTIVE:    PHYSICAL EXAM:    Vitals:    01/17/24 2317 01/18/24 0248 01/18/24 0450 01/18/24 0626   BP: 131/66  (!) 145/73    Pulse: 74 67 88 92   Resp: 16  18    Temp: 97.2 °F (36.2 °C)  97.6 °F (36.4 °C)    TempSrc: Oral  Oral    SpO2: 96%  92%    Weight:   76.2 kg (167 lb 15.9 oz)    Height:           General appearance: alert, no acute distress  Eyes: PERRLA, no scleral icterus  Neck: trachea midline  Chest/Lungs: normal effort on 5L NC  Cardiovascular: RRR,   Skin: chronic venous stasis changes present over bilateral lower extremities.   Extremities: no edema, no cyanosis  Neuro: A&Ox3, no focal deficits, sensation intact     Pulses     F PT DP   R + -,+ +   L + -,+ -,+    +, -/+ ,-/-    LABS:   Recent Labs     01/17/24  0700 01/18/24  0510   WBC 6.4 5.9   HGB 11.0* 9.2*   HCT 34.5* 28.0*   MCV 84.2 82.1    227        Recent Labs     01/17/24  0700 01/17/24  1345 01/18/24  0510     --  138   K 5.6* 4.4 5.3*     --  105   CO2 17*  --  19*   BUN 63*  --  70*   CREATININE 2.5*  --  2.8*      No results for input(s): \"AST\", \"ALT\", \"ALB\", \"BILIDIR\", \"BILITOT\", \"ALKPHOS\" in the last 72 hours.   No results for input(s): \"LIPASE\", \"AMYLASE\" in the last 72 hours.   No results for input(s): \"PROT\", \"INR\", \"APTT\" in the last 72 hours.   No results for input(s): \"CKTOTAL\", \"CKMB\", \"CKMBINDEX\", \"TROPONINI\" in the last 72 hours.      ASSESSMENT & PLAN:   This is a 89 y.o. female with hx hypothyroidism, HF, HTN, PAD w/ prior R fem pop bypass 2015, knee osteoarthritis w/ prior b/l knee replacements, for whom vascular surgery is consulted for L SFA occlusion seen on CTA. Patient has reconstitution of flow 
Clinically unable to determine / Unknown  -- Refer to Clinical Documentation Reviewer    PROVIDER RESPONSE TEXT:    This patient is in acute respiratory failure as evidenced by V/S 1/17 O2 sat.   =84% on room air; Patient on 3-5L via nasal canula.    Query created by: Joselin Tran on 1/18/2024 3:04 PM      Electronically signed by:  CRYSTAL BARRETT 1/19/2024 5:03 PM          
needed climbing 3-5 steps with a railing?: Total  AM-PAC Inpatient Mobility Raw Score : 8  AM-PAC Inpatient T-Scale Score : 28.52  Mobility Inpatient CMS 0-100% Score: 86.62  Mobility Inpatient CMS G-Code Modifier : CM         Tinneti Score       Goals  Short Term Goals  Time Frame for Short Term Goals: discharge  Short Term Goal 1: Pt will transfer sit <--> stand with CGA  Short Term Goal 2: Pt will amb 50 ft with walker and CGA  Short Term Goal 3: Pt will demo static stance x 1 min with walker and CGA  Patient Goals   Patient Goals : pt unable to state; son states goal is to return to [place] apt       Education  Patient Education  Education Given To: Patient  Education Provided: Role of Therapy  Education Provided Comments: d/c planning  Education Method: Demonstration  Barriers to Learning: Hearing  Education Outcome: Demonstrated understanding;Continued education needed      Therapy Time   Individual Concurrent Group Co-treatment   Time In 0955         Time Out 1040         Minutes 45                 Timed Code Treatment Minutes:  30    Total Treatment Minutes:  45    If patient is discharged prior to next treatment, this note will serve as the discharge summary.  Marjorie Kramer, PT, DPT  [id number]     
ARTERIES. 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    XR CHEST PORTABLE    Result Date: 1/17/2024  EXAM: PORTABLE AP CHEST X-RAY, 7:44 a.m. INDICATION: pain, fall, COMPARISON: none FINDINGS: HEART / MEDIASTINUM: Heart is mildly enlarged. LUNGS/PLEURA: Mild hazy density and diffuse interstitial prominence is present throughout both lungs. No pneumothorax. BONES / SOFT TISSUES: Mild thoracic scoliosis and diffuse degenerative change. No acute abnormality. Right shoulder arthropathy with severe glenohumeral joint space narrowing. OTHER: None.     1. No evidence of acute traumatic abnormality. 2. Mild hazy density and diffuse interstitial prominence throughout both lungs, chronic versus acute. 3. Mild thoracic scoliosis and diffuse degenerative change. Right shoulder arthropathy.     XR SHOULDER LEFT (MIN 2 VIEWS)    Result Date: 1/17/2024  EXAM: XR SHOULDER LEFT (MIN 2 VIEWS) INDICATION: pain after fall, pain COMPARISON: None FINDINGS: No acute fracture or dislocation. Moderate left glenohumeral osteoarthritis and acromioclavicular joint arthropathy. Cortical erosions of the humeral head suggest chronic rotator cuff tendinopathy.     1. No acute abnormality. 2. Moderate left shoulder arthropathy.    XR HIP 2-3 VW W PELVIS LEFT    Result Date: 1/17/2024  EXAM: XRAY HIP, LEFT INDICATION: pain, COMPARISON: None TECHNIQUE: *  PELVIS # OF VIEWS: 1 *  HIP # OF VIEWS: 2 FINDINGS: No fracture or dislocation. Mild left hip osteoarthritis. Sacroiliac joints demonstrate no significant sclerosis or fusion. Arterial calcification present.     1. No acute abnormality. 2. Mild left hip osteoarthritis. 3. Atherosclerotic disease.       CBC:   Recent Labs     01/17/24  0700 01/18/24  0510 01/19/24  0455   WBC 6.4 5.9 5.6   HGB 11.0* 9.2* 8.5*    227 211     BMP:    Recent Labs     01/17/24  0700 01/17/24  1345 01/18/24  0510 01/19/24  0455     --  138 135*   K 5.6* 4.4 5.3* 4.7     
lungs. No pneumothorax. BONES / SOFT TISSUES: Mild thoracic scoliosis and diffuse degenerative change. No acute abnormality. Right shoulder arthropathy with severe glenohumeral joint space narrowing. OTHER: None.     1. No evidence of acute traumatic abnormality. 2. Mild hazy density and diffuse interstitial prominence throughout both lungs, chronic versus acute. 3. Mild thoracic scoliosis and diffuse degenerative change. Right shoulder arthropathy.     XR SHOULDER LEFT (MIN 2 VIEWS)    Result Date: 1/17/2024  EXAM: XR SHOULDER LEFT (MIN 2 VIEWS) INDICATION: pain after fall, pain COMPARISON: None FINDINGS: No acute fracture or dislocation. Moderate left glenohumeral osteoarthritis and acromioclavicular joint arthropathy. Cortical erosions of the humeral head suggest chronic rotator cuff tendinopathy.     1. No acute abnormality. 2. Moderate left shoulder arthropathy.    XR HIP 2-3 VW W PELVIS LEFT    Result Date: 1/17/2024  EXAM: XRAY HIP, LEFT INDICATION: pain, COMPARISON: None TECHNIQUE: *  PELVIS # OF VIEWS: 1 *  HIP # OF VIEWS: 2 FINDINGS: No fracture or dislocation. Mild left hip osteoarthritis. Sacroiliac joints demonstrate no significant sclerosis or fusion. Arterial calcification present.     1. No acute abnormality. 2. Mild left hip osteoarthritis. 3. Atherosclerotic disease.       CBC:   Recent Labs     01/17/24  0700 01/18/24  0510   WBC 6.4 5.9   HGB 11.0* 9.2*    227     BMP:    Recent Labs     01/17/24  0700 01/17/24  1345 01/18/24  0510     --  138   K 5.6* 4.4 5.3*     --  105   CO2 17*  --  19*   BUN 63*  --  70*   CREATININE 2.5*  --  2.8*   GLUCOSE 102*  --  104*     Hepatic: No results for input(s): \"AST\", \"ALT\", \"ALB\", \"BILITOT\", \"ALKPHOS\" in the last 72 hours.  Lipids: No results found for: \"CHOL\", \"HDL\", \"TRIG\"  Hemoglobin A1C: No results found for: \"LABA1C\"  TSH: No results found for: \"TSH\"  Troponin: No results found for: \"TROPONINT\"  Lactic Acid:   Recent Labs

## 2024-01-21 NOTE — CARE COORDINATION
CM continues to follow for DC planning and needs. Patient worked with therapy today, family is hopeful for return to AL at [facility] and agreeable to home PT/OT upon return. Patient continues with diuresis and monitoring renal function and electrolytes. Family had no preference for [facility], [facility] given to [facility] upon DC. Patient continues on O2 at 4L and typically does not wear any at home.    CM will continue to follow for DC planning and needs.    Thank you,  Osei MCALLISTERN, RN,   4th Floor Progressive Care Unit  [phone]    
CM following for discharge planning. MD spoke with CM regarding placement of Hospice referral. CM met with pt and son Maico at bedside. Maico spoke with his siblings and they would like a referral sent to [facility] which is across the street from [facility], where pt resides. Referral made. CM spoke to Farida and medical info faxed. Farida to call Maico to set up a time to meet with the family. Await call back regarding time.       Addendum: Pt and family to meet with [facility] liaison at 16:30 pm today.     Niru Zimmerman RN, BSN, CM  Case Management Department  [phone]   
Called [facility] [phone] and am awaiting a return call from Farida to ask about the family meeting yesterday.     Electronically signed by Susan Hernandez RN on 1/21/2024 at 8:41 AM  [phone]  
Case Management Assessment  Initial Evaluation    Date/Time of Evaluation: 1/18/2024 4:33 PM  Assessment Completed by: Osei Guzman RN    If patient is discharged prior to next notation, then this note serves as note for discharge by case management.    Patient Name: Janeth Pacheco                   YOB: 1929  Diagnosis: Hypoxia [R09.02]  Left leg pain [M79.605]  Acute decompensated heart failure (HCC) [I50.9]  Hypervolemia, unspecified hypervolemia type [E87.70]                   Date / Time: 1/17/2024  6:44 AM    Patient Admission Status: Inpatient   Readmission Risk (Low < 19, Mod (19-27), High > 27): Readmission Risk Score: 13.9    Current PCP: Tone Suarez MD  PCP verified by CM? Yes    Chart Reviewed: Yes      History Provided by: Child/Family (daughter at bedside)  Patient Orientation: Alert and Oriented    Patient Cognition: Alert    Hospitalization in the last 30 days (Readmission):  No    If yes, Readmission Assessment in  Navigator will be completed.       Advance Directives:      Code Status: Limited   Patient's Primary Decision Maker is: Named in Scanned ACP Document (flavio ALVAREZ (to bring in paperwork))      Copy present: No     Discharge Planning:    Patient lives with: Alone  Type of Home: Assisted living (return to AL at [facility])  Primary Care Giver: Other (Comment) (AL staff at [facility])  Patient Support Systems include: Children     Current Financial resources: Medicare    Current community resources: Assisted Living ([facility])    Currently ACTIVE with [place] on Aging: No    Current services prior to admission: Durable Medical Equipment            Current DME: Walker            Type of Home Care services:  OT, PT (family and patient agreeable to try home PT/OT upon return to [facility])    Home Care Information  Currently ACTIVE with Home Health Care: No  Home Care Agency: Not Applicable    ADLS  Prior 
manager or  CANNOT provide pharmacy voucher for patients co-pays  5. Patients can then  the prescription on their way out of the hospital at discharge, or pharmacy can deliver to the bedside if staff is available. (payment due at time of pick-up or delivery - cash, check, or card accepted)     Able to afford home medications/ co-pay costs: Yes    ADLS:  Current PT AM-PAC Score: 8 /24  Current OT AM-PAC Score: 14 /24      DISCHARGE Disposition: Inpatient Hospice Unit: [facility]     LOC at discharge: Not Applicable  KAY Completed: Yes    Notification completed in HENS/PAS?:  No    IMM Completed:   No         Transportation:  Transportation PLAN for discharge: EMS transportation   Mode of Transport: Ambulance stretcher - BLS  Reason for medical transport: Bed confined: Meets the following criteria 1) unable to get out of bed without assistance or ambulate, 2) unable to safely sit up in a wheelchair, 3) unable to maintain erect seating position in a chair for time needed for transport  Name of Transport Company: [employer]  Phone: [phone]  Time of Transport: 1215    Transport form completed: Yes      Additional CM Notes: Patient has been accepted to hospice and will d/c to the IPU at [facility] today at 12:15. Deirdre from [facility] will be in today to help facilitate this transfer.     COVID Result:    Lab Results   Component Value Date/Time    COVID19 NOT DETECTED 01/17/2024 07:01 AM       The Plan for Transition of Care is related to the following treatment goals of Hypoxia [R09.02]  Left leg pain [M79.605]  Acute decompensated heart failure (HCC) [I50.9]  Hypervolemia, unspecified hypervolemia type [E87.70]    The Patient and/or patient representative Janeth and her family were provided with a choice of provider and agrees with the discharge plan Yes    Freedom of choice list was provided with basic dialogue that supports the patient's individualized plan

## 2024-01-21 NOTE — DISCHARGE INSTR - DIET

## 2024-01-21 NOTE — PLAN OF CARE
Problem: Discharge Planning  Goal: Discharge to home or other facility with appropriate resources  1/21/2024 1237 by Tyler Lopez RN  Outcome: Progressing  Flowsheets (Taken 1/20/2024 2035 by Yamileth Kline, RN)  Discharge to home or other facility with appropriate resources:   Identify barriers to discharge with patient and caregiver   Arrange for needed discharge resources and transportation as appropriate   Identify discharge learning needs (meds, wound care, etc)     Problem: Pain  Goal: Verbalizes/displays adequate comfort level or baseline comfort level  1/21/2024 1237 by Tyler Lopez RN  Outcome: Progressing  Flowsheets (Taken 1/19/2024 0120 by Grace White, RN)  Verbalizes/displays adequate comfort level or baseline comfort level:   Encourage patient to monitor pain and request assistance   Assess pain using appropriate pain scale   Administer analgesics based on type and severity of pain and evaluate response   Implement non-pharmacological measures as appropriate and evaluate response     Problem: ABCDS Injury Assessment  Goal: Absence of physical injury  Outcome: Progressing  Flowsheets (Taken 1/20/2024 2035 by Yamileth Kline, RN)  Absence of Physical Injury: Implement safety measures based on patient assessment     
  Problem: Discharge Planning  Goal: Discharge to home or other facility with appropriate resources  Outcome: Progressing     Problem: Pain  Goal: Verbalizes/displays adequate comfort level or baseline comfort level  Outcome: Progressing   Medication given for pain management, see MAR  Problem: Safety - Adult  Goal: Free from fall injury  Outcome: Progressing   Call light within reach, bed alarm on, bed in lowest position  Problem: Skin/Tissue Integrity  Goal: Absence of new skin breakdown  Description: 1.  Monitor for areas of redness and/or skin breakdown  2.  Assess vascular access sites hourly  3.  Every 4-6 hours minimum:  Change oxygen saturation probe site  4.  Every 4-6 hours:  If on nasal continuous positive airway pressure, respiratory therapy assess nares and determine need for appliance change or resting period.  Outcome: Progressing   Aury care given to patient  Problem: ABCDS Injury Assessment  Goal: Absence of physical injury  Outcome: Progressing     
  Problem: Discharge Planning  Goal: Discharge to home or other facility with appropriate resources  Outcome: Progressing  Flowsheets (Taken 1/19/2024 1930 by Yamileth Kline, RN)  Discharge to home or other facility with appropriate resources:   Identify barriers to discharge with patient and caregiver   Arrange for needed discharge resources and transportation as appropriate   Identify discharge learning needs (meds, wound care, etc)     Problem: Pain  Goal: Verbalizes/displays adequate comfort level or baseline comfort level  Outcome: Progressing  Flowsheets (Taken 1/19/2024 0120 by Grace White, RN)  Verbalizes/displays adequate comfort level or baseline comfort level:   Encourage patient to monitor pain and request assistance   Assess pain using appropriate pain scale   Administer analgesics based on type and severity of pain and evaluate response   Implement non-pharmacological measures as appropriate and evaluate response     Problem: Safety - Adult  Goal: Free from fall injury  Outcome: Progressing  Flowsheets (Taken 1/20/2024 1823)  Free From Fall Injury: Instruct family/caregiver on patient safety     
  Problem: Discharge Planning  Goal: Discharge to home or other facility with appropriate resources  Outcome: Progressing  Flowsheets (Taken 1/19/2024 1930)  Discharge to home or other facility with appropriate resources:   Identify barriers to discharge with patient and caregiver   Arrange for needed discharge resources and transportation as appropriate   Identify discharge learning needs (meds, wound care, etc)     Problem: Pain  Goal: Verbalizes/displays adequate comfort level or baseline comfort level  Outcome: Progressing     Problem: Safety - Adult  Goal: Free from fall injury  Outcome: Progressing  Flowsheets (Taken 1/19/2024 1930)  Free From Fall Injury: Instruct family/caregiver on patient safety     Problem: Skin/Tissue Integrity  Goal: Absence of new skin breakdown  Description: 1.  Monitor for areas of redness and/or skin breakdown  2.  Assess vascular access sites hourly  3.  Every 4-6 hours minimum:  Change oxygen saturation probe site  4.  Every 4-6 hours:  If on nasal continuous positive airway pressure, respiratory therapy assess nares and determine need for appliance change or resting period.  Outcome: Progressing     Problem: ABCDS Injury Assessment  Goal: Absence of physical injury  Outcome: Progressing  Flowsheets (Taken 1/19/2024 1930)  Absence of Physical Injury: Implement safety measures based on patient assessment     Problem: Cardiovascular - Adult  Goal: Maintains optimal cardiac output and hemodynamic stability  Outcome: Progressing  Flowsheets (Taken 1/19/2024 1930)  Maintains optimal cardiac output and hemodynamic stability:   Monitor blood pressure and heart rate   Monitor urine output and notify Licensed Independent Practitioner for values outside of normal range   Assess for signs of decreased cardiac output     Problem: Metabolic/Fluid and Electrolytes - Adult  Goal: Electrolytes maintained within normal limits  Outcome: Progressing  Flowsheets (Taken 1/19/2024 1930)  Electrolytes 
  Problem: Pain  Goal: Verbalizes/displays adequate comfort level or baseline comfort level  Outcome: Progressing  Flowsheets (Taken 1/19/2024 0120)  Verbalizes/displays adequate comfort level or baseline comfort level:   Encourage patient to monitor pain and request assistance   Assess pain using appropriate pain scale   Administer analgesics based on type and severity of pain and evaluate response   Implement non-pharmacological measures as appropriate and evaluate response     Problem: Safety - Adult  Goal: Free from fall injury  Outcome: Progressing  Flowsheets (Taken 1/19/2024 0120)  Free From Fall Injury: Instruct family/caregiver on patient safety     Problem: Skin/Tissue Integrity  Goal: Absence of new skin breakdown  Description: 1.  Monitor for areas of redness and/or skin breakdown  2.  Assess vascular access sites hourly  3.  Every 4-6 hours minimum:  Change oxygen saturation probe site  4.  Every 4-6 hours:  If on nasal continuous positive airway pressure, respiratory therapy assess nares and determine need for appliance change or resting period.  Outcome: Progressing     Problem: Respiratory - Adult  Goal: Achieves optimal ventilation and oxygenation  Outcome: Progressing  Flowsheets (Taken 1/19/2024 0120)  Achieves optimal ventilation and oxygenation:   Assess for changes in respiratory status   Position to facilitate oxygenation and minimize respiratory effort   Assess for changes in mentation and behavior   Oxygen supplementation based on oxygen saturation or arterial blood gases     Problem: Cardiovascular - Adult  Goal: Maintains optimal cardiac output and hemodynamic stability  Outcome: Progressing  Flowsheets (Taken 1/19/2024 0120)  Maintains optimal cardiac output and hemodynamic stability:   Monitor blood pressure and heart rate   Assess for signs of decreased cardiac output     
Problem: Discharge Planning  Goal: Discharge to home or other facility with appropriate resources  1/18/2024 0947 by Tali Dwyer, RN  Outcome: Progressing  Flowsheets (Taken 1/18/2024 0813)  Discharge to home or other facility with appropriate resources: Identify barriers to discharge with patient and caregiver.     Problem: Pain  Goal: Verbalizes/displays adequate comfort level or baseline comfort level  1/18/2024 0947 by Tali Dwyer, RN  Outcome: Progressing  Flowsheets (Taken 1/18/2024 0947)  Verbalizes/displays adequate comfort level or baseline comfort level: Assess pain using appropriate pain scale.  -pt reports pain in bilateral knees. PRN Tylenol given and Voltaren gel applied to site. Pt reports adequate pain relief with current interventions. Will continue to monitor pain level.    Problem: Safety - Adult  Goal: Free from fall injury  1/18/2024 0947 by Tali Dwyer, RN  Outcome: Progressing  Flowsheets (Taken 1/18/2024 0754)  Free From Fall Injury: Instruct family/caregiver on patient safety.  -Fall precautions in place. Bed alarm on. Pt has not attempted to get out of bed unassisted.     Problem: Skin/Tissue Integrity  Goal: Absence of new skin breakdown  Description: 1.  Monitor for areas of redness and/or skin breakdown  2.  Assess vascular access sites hourly  3.  Every 4-6 hours minimum:  Change oxygen saturation probe site  4.  Every 4-6 hours:  If on nasal continuous positive airway pressure, respiratory therapy assess nares and determine need for appliance change or resting period.  1/18/2024 0947 by Tali Dwyer, RN  Outcome: Progressing  -L shin skin tear present. Scattered abrasions and bruising noted and blanchable redness on coccyx. Pt assisted with repositioning q2h. Low air loss mattress ordered. Will continue to monitor skin integrity.  
Problem: Discharge Planning  Goal: Discharge to home or other facility with appropriate resources  Outcome: Progressing  Flowsheets (Taken 1/18/2024 0813)  Discharge to home or other facility with appropriate resources: Identify barriers to discharge with patient and caregiver     Problem: Pain  Goal: Verbalizes/displays adequate comfort level or baseline comfort level  1/19/2024 0948 by Tali Dwyer, RN  Outcome: Progressing  Flowsheets   Verbalizes/displays adequate comfort level or baseline comfort level:   Encourage patient to monitor pain and request assistance   Assess pain using appropriate pain scale   Administer analgesics based on type and severity of pain and evaluate response   Implement non-pharmacological measures as appropriate and evaluate response  -Pt reports pain in bilateral knees. Voltaren gel applied to site and scheduled PO Robaxin given. Will continue to monitor pain level.     Problem: Safety - Adult  Goal: Free from fall injury  1/19/2024 0948 by Tali Dwyer, RN  Outcome: Progressing  Flowsheets   Free From Fall Injury: Instruct family/caregiver on patient safety  -Fall precautions in place; bed alarm on. Pt has not attempted to get out of bed unassisted.     Problem: Skin/Tissue Integrity  Goal: Absence of new skin breakdown  Description: 1.  Monitor for areas of redness and/or skin breakdown  2.  Assess vascular access sites hourly  3.  Every 4-6 hours minimum:  Change oxygen saturation probe site  4.  Every 4-6 hours:  If on nasal continuous positive airway pressure, respiratory therapy assess nares and determine need for appliance change or resting period.  1/19/2024 0948 by Tali Dwyer, RN  Outcome: Progressing  -Low air loss mattress in use and Prevalon boots in place to BLE. Pt assisted with turning q2h. No new skin breakdown noted. Will continue to monitor skin integrity.     Problem: Respiratory - Adult  Goal: Achieves optimal ventilation and oxygenation  Outcome: 
Practitioner for values outside of normal range   Assess for signs of decreased cardiac output